# Patient Record
Sex: FEMALE | Race: WHITE | NOT HISPANIC OR LATINO | URBAN - METROPOLITAN AREA
[De-identification: names, ages, dates, MRNs, and addresses within clinical notes are randomized per-mention and may not be internally consistent; named-entity substitution may affect disease eponyms.]

---

## 2022-07-09 ENCOUNTER — TELEPHONE ENCOUNTER (OUTPATIENT)
Dept: URBAN - METROPOLITAN AREA CLINIC 121 | Facility: CLINIC | Age: 87
End: 2022-07-09

## 2022-07-10 ENCOUNTER — TELEPHONE ENCOUNTER (OUTPATIENT)
Dept: URBAN - METROPOLITAN AREA CLINIC 121 | Facility: CLINIC | Age: 87
End: 2022-07-10

## 2022-07-10 RX ORDER — OMEGA-3-ACID ETHYL ESTERS 1 G/1
CAPSULE, LIQUID FILLED ORAL
Refills: 0 | Status: ACTIVE | COMMUNITY
Start: 2019-11-01

## 2022-07-10 RX ORDER — ASPIRIN 81 MG/1
TABLET, COATED ORAL
Refills: 0 | Status: ACTIVE | COMMUNITY
Start: 2019-11-01

## 2022-07-10 RX ORDER — DEXTROMETHORPHAN POLISTIREX 30 MG/5 ML
SUSPENSION, EXTENDED RELEASE 12 HR ORAL ONCE A DAY
Refills: 0 | Status: ACTIVE | COMMUNITY
Start: 2019-11-01

## 2022-07-10 RX ORDER — IRBESARTAN 75 MG/1
TABLET ORAL
Refills: 0 | Status: ACTIVE | COMMUNITY
Start: 2019-11-01

## 2022-07-10 RX ORDER — NEBIVOLOL HYDROCHLORIDE 10 MG/1
TABLET ORAL
Refills: 0 | Status: ACTIVE | COMMUNITY
Start: 2019-11-01

## 2022-07-10 RX ORDER — AMLODIPINE BESYLATE 5 MG/1
TABLET ORAL ONCE A DAY
Refills: 0 | Status: ACTIVE | COMMUNITY
Start: 2019-11-01

## 2023-05-08 ENCOUNTER — APPOINTMENT (OUTPATIENT)
Dept: PRIMARY CARE | Facility: CLINIC | Age: 88
End: 2023-05-08
Payer: MEDICARE

## 2023-05-19 DIAGNOSIS — E53.8 VITAMIN B12 DEFICIENCY: ICD-10-CM

## 2023-05-19 PROBLEM — M79.673 FOOT PAIN: Status: ACTIVE | Noted: 2023-05-19

## 2023-05-19 PROBLEM — G89.29 CHRONIC BACK PAIN: Status: ACTIVE | Noted: 2023-05-19

## 2023-05-19 PROBLEM — K21.9 GERD (GASTROESOPHAGEAL REFLUX DISEASE): Status: ACTIVE | Noted: 2023-05-19

## 2023-05-19 PROBLEM — M19.90 INFLAMMATORY ARTHROPATHY: Status: ACTIVE | Noted: 2023-05-19

## 2023-05-19 PROBLEM — L98.9 SKIN LESION: Status: ACTIVE | Noted: 2023-05-19

## 2023-05-19 PROBLEM — D50.9 IRON DEFICIENCY ANEMIA: Status: ACTIVE | Noted: 2023-05-19

## 2023-05-19 PROBLEM — R53.83 FATIGUE: Status: ACTIVE | Noted: 2023-05-19

## 2023-05-19 PROBLEM — S49.90XA SHOULDER INJURY: Status: ACTIVE | Noted: 2023-05-19

## 2023-05-19 PROBLEM — D64.9 ANEMIA: Status: ACTIVE | Noted: 2023-05-19

## 2023-05-19 PROBLEM — N32.81 OVERACTIVE BLADDER: Status: ACTIVE | Noted: 2023-05-19

## 2023-05-19 PROBLEM — R76.8 POSITIVE ANA (ANTINUCLEAR ANTIBODY): Status: ACTIVE | Noted: 2023-05-19

## 2023-05-19 PROBLEM — M25.50 ARTHRALGIA: Status: ACTIVE | Noted: 2023-05-19

## 2023-05-19 PROBLEM — L30.9 ECZEMA: Status: ACTIVE | Noted: 2023-05-19

## 2023-05-19 PROBLEM — I10 HTN (HYPERTENSION): Status: ACTIVE | Noted: 2023-05-19

## 2023-05-19 PROBLEM — J30.9 ALLERGIC RHINITIS: Status: ACTIVE | Noted: 2023-05-19

## 2023-05-19 PROBLEM — N18.30 CKD (CHRONIC KIDNEY DISEASE), STAGE III (MULTI): Status: ACTIVE | Noted: 2023-05-19

## 2023-05-19 PROBLEM — L03.90 CELLULITIS: Status: ACTIVE | Noted: 2023-05-19

## 2023-05-19 PROBLEM — E55.9 VITAMIN D DEFICIENCY: Status: ACTIVE | Noted: 2023-05-19

## 2023-05-19 PROBLEM — I48.0 PAROXYSMAL ATRIAL FIBRILLATION WITH RVR (MULTI): Status: ACTIVE | Noted: 2023-05-19

## 2023-05-19 PROBLEM — M54.9 CHRONIC BACK PAIN: Status: ACTIVE | Noted: 2023-05-19

## 2023-05-19 RX ORDER — CYANOCOBALAMIN 1000 UG/ML
1000 INJECTION, SOLUTION INTRAMUSCULAR; SUBCUTANEOUS
Qty: 10 ML | Refills: 0 | Status: SHIPPED | OUTPATIENT
Start: 2023-05-19

## 2023-05-19 NOTE — TELEPHONE ENCOUNTER
Sp with dtr.     Has not had vitamin B12 since February.     Needs it called into her [facility].     She will have her friend/neighbor administer

## 2023-05-19 NOTE — TELEPHONE ENCOUNTER
Patient lm- she has skipped 3 months of b12. [place].     C/o weakness, fatigue  heart palpitations    Dtr told her these are sx of low b12    Requesting rx vitamin b12 injections

## 2023-06-29 DIAGNOSIS — I10 PRIMARY HYPERTENSION: Primary | ICD-10-CM

## 2023-06-29 RX ORDER — LOSARTAN POTASSIUM 50 MG/1
TABLET ORAL
Qty: 90 TABLET | Refills: 3 | Status: SHIPPED | OUTPATIENT
Start: 2023-06-29 | End: 2023-09-07 | Stop reason: SDUPTHER

## 2023-08-29 ENCOUNTER — OFFICE VISIT (OUTPATIENT)
Dept: PRIMARY CARE | Facility: CLINIC | Age: 88
End: 2023-08-29
Payer: MEDICARE

## 2023-08-29 VITALS
OXYGEN SATURATION: 96 % | SYSTOLIC BLOOD PRESSURE: 130 MMHG | BODY MASS INDEX: 25.16 KG/M2 | RESPIRATION RATE: 14 BRPM | DIASTOLIC BLOOD PRESSURE: 68 MMHG | WEIGHT: 142 LBS | TEMPERATURE: 97.2 F | HEIGHT: 63 IN | HEART RATE: 78 BPM

## 2023-08-29 DIAGNOSIS — N63.32 MASS OF AXILLARY TAIL OF LEFT BREAST: ICD-10-CM

## 2023-08-29 DIAGNOSIS — L30.9 ECZEMA, UNSPECIFIED TYPE: ICD-10-CM

## 2023-08-29 DIAGNOSIS — N18.31 STAGE 3A CHRONIC KIDNEY DISEASE (MULTI): ICD-10-CM

## 2023-08-29 DIAGNOSIS — Z00.00 WELLNESS EXAMINATION: ICD-10-CM

## 2023-08-29 DIAGNOSIS — I48.0 PAROXYSMAL ATRIAL FIBRILLATION WITH RVR (MULTI): ICD-10-CM

## 2023-08-29 DIAGNOSIS — M25.50 ARTHRALGIA, UNSPECIFIED JOINT: ICD-10-CM

## 2023-08-29 DIAGNOSIS — M54.9 CHRONIC BACK PAIN, UNSPECIFIED BACK LOCATION, UNSPECIFIED BACK PAIN LATERALITY: ICD-10-CM

## 2023-08-29 DIAGNOSIS — R26.89 BALANCE PROBLEM: ICD-10-CM

## 2023-08-29 DIAGNOSIS — J30.2 SEASONAL ALLERGIES: ICD-10-CM

## 2023-08-29 DIAGNOSIS — G89.29 CHRONIC BACK PAIN, UNSPECIFIED BACK LOCATION, UNSPECIFIED BACK PAIN LATERALITY: ICD-10-CM

## 2023-08-29 DIAGNOSIS — E53.8 VITAMIN B12 DEFICIENCY: ICD-10-CM

## 2023-08-29 DIAGNOSIS — J81.0 ACUTE PULMONARY EDEMA (MULTI): Primary | ICD-10-CM

## 2023-08-29 DIAGNOSIS — D64.9 ANEMIA, UNSPECIFIED TYPE: ICD-10-CM

## 2023-08-29 DIAGNOSIS — N63.20 MASS OF LEFT BREAST, UNSPECIFIED QUADRANT: ICD-10-CM

## 2023-08-29 PROBLEM — S49.90XA SHOULDER INJURY: Status: RESOLVED | Noted: 2023-05-19 | Resolved: 2023-08-29

## 2023-08-29 PROBLEM — L98.9 SKIN LESION: Status: RESOLVED | Noted: 2023-05-19 | Resolved: 2023-08-29

## 2023-08-29 PROBLEM — M79.673 FOOT PAIN: Status: RESOLVED | Noted: 2023-05-19 | Resolved: 2023-08-29

## 2023-08-29 LAB — POC HEMOGLOBIN: 11.9 G/DL (ref 12–16)

## 2023-08-29 PROCEDURE — 99214 OFFICE O/P EST MOD 30 MIN: CPT | Performed by: INTERNAL MEDICINE

## 2023-08-29 PROCEDURE — 3075F SYST BP GE 130 - 139MM HG: CPT | Performed by: INTERNAL MEDICINE

## 2023-08-29 PROCEDURE — 85018 HEMOGLOBIN: CPT | Performed by: INTERNAL MEDICINE

## 2023-08-29 PROCEDURE — 90662 IIV NO PRSV INCREASED AG IM: CPT | Performed by: INTERNAL MEDICINE

## 2023-08-29 PROCEDURE — 3078F DIAST BP <80 MM HG: CPT | Performed by: INTERNAL MEDICINE

## 2023-08-29 PROCEDURE — G0439 PPPS, SUBSEQ VISIT: HCPCS | Performed by: INTERNAL MEDICINE

## 2023-08-29 PROCEDURE — G0008 ADMIN INFLUENZA VIRUS VAC: HCPCS | Performed by: INTERNAL MEDICINE

## 2023-08-29 PROCEDURE — 1159F MED LIST DOCD IN RCRD: CPT | Performed by: INTERNAL MEDICINE

## 2023-08-29 PROCEDURE — 1170F FXNL STATUS ASSESSED: CPT | Performed by: INTERNAL MEDICINE

## 2023-08-29 PROCEDURE — 1160F RVW MEDS BY RX/DR IN RCRD: CPT | Performed by: INTERNAL MEDICINE

## 2023-08-29 PROCEDURE — 1036F TOBACCO NON-USER: CPT | Performed by: INTERNAL MEDICINE

## 2023-08-29 PROCEDURE — 1157F ADVNC CARE PLAN IN RCRD: CPT | Performed by: INTERNAL MEDICINE

## 2023-08-29 PROCEDURE — 96372 THER/PROPH/DIAG INJ SC/IM: CPT | Performed by: INTERNAL MEDICINE

## 2023-08-29 RX ORDER — CLOBETASOL PROPIONATE 0.46 MG/ML
SOLUTION TOPICAL 2 TIMES DAILY
COMMUNITY
End: 2023-08-29 | Stop reason: SDUPTHER

## 2023-08-29 RX ORDER — OMEGA-3/DHA/EPA/FISH OIL 300-1000MG
CAPSULE,DELAYED RELEASE (ENTERIC COATED) ORAL 2 TIMES DAILY
COMMUNITY

## 2023-08-29 RX ORDER — ALBUTEROL SULFATE 90 UG/1
2 AEROSOL, METERED RESPIRATORY (INHALATION) EVERY 4 HOURS PRN
COMMUNITY
Start: 2014-12-29

## 2023-08-29 RX ORDER — LANOLIN ALCOHOL/MO/W.PET/CERES
1 CREAM (GRAM) TOPICAL DAILY
COMMUNITY
Start: 2013-09-17

## 2023-08-29 RX ORDER — CYANOCOBALAMIN 1000 UG/ML
1000 INJECTION, SOLUTION INTRAMUSCULAR; SUBCUTANEOUS ONCE
Status: COMPLETED | OUTPATIENT
Start: 2023-08-29 | End: 2023-08-29

## 2023-08-29 RX ORDER — FLUTICASONE PROPIONATE 50 MCG
2 SPRAY, SUSPENSION (ML) NASAL DAILY
Qty: 16 G | Refills: 3 | Status: SHIPPED | OUTPATIENT
Start: 2023-08-29

## 2023-08-29 RX ORDER — PANTOPRAZOLE SODIUM 40 MG/1
40 TABLET, DELAYED RELEASE ORAL
Qty: 90 TABLET | Refills: 3 | Status: SHIPPED | OUTPATIENT
Start: 2023-08-29 | End: 2023-09-05

## 2023-08-29 RX ORDER — FERROUS SULFATE 325(65) MG
325 TABLET ORAL
COMMUNITY
Start: 2023-06-13 | End: 2024-02-12 | Stop reason: WASHOUT

## 2023-08-29 RX ORDER — HYDROXYZINE HYDROCHLORIDE 25 MG/1
25 TABLET, FILM COATED ORAL EVERY 8 HOURS PRN
COMMUNITY

## 2023-08-29 RX ORDER — AMOXICILLIN 250 MG
2 CAPSULE ORAL DAILY PRN
COMMUNITY
Start: 2018-12-06

## 2023-08-29 RX ORDER — FLUTICASONE PROPIONATE 50 MCG
2 SPRAY, SUSPENSION (ML) NASAL DAILY
COMMUNITY
Start: 2015-10-21 | End: 2023-08-29 | Stop reason: SDUPTHER

## 2023-08-29 RX ORDER — DILTIAZEM HYDROCHLORIDE 120 MG/1
120 CAPSULE, COATED, EXTENDED RELEASE ORAL EVERY 12 HOURS
COMMUNITY
Start: 2023-08-08

## 2023-08-29 RX ORDER — PANTOPRAZOLE SODIUM 40 MG/1
40 TABLET, DELAYED RELEASE ORAL 2 TIMES DAILY
COMMUNITY
Start: 2023-06-13 | End: 2023-08-29 | Stop reason: DRUGHIGH

## 2023-08-29 RX ORDER — ACETAMINOPHEN 500 MG
500 TABLET ORAL
COMMUNITY

## 2023-08-29 RX ORDER — HYDRALAZINE HYDROCHLORIDE 25 MG/1
25 TABLET, FILM COATED ORAL NIGHTLY
COMMUNITY

## 2023-08-29 RX ORDER — CLOBETASOL PROPIONATE 0.46 MG/ML
SOLUTION TOPICAL 2 TIMES DAILY
Qty: 150 ML | Refills: 3 | Status: SHIPPED | OUTPATIENT
Start: 2023-08-29

## 2023-08-29 RX ORDER — FERROUS SULFATE, DRIED 160(50) MG
1 TABLET, EXTENDED RELEASE ORAL
COMMUNITY

## 2023-08-29 RX ADMIN — CYANOCOBALAMIN 1000 MCG: 1000 INJECTION, SOLUTION INTRAMUSCULAR; SUBCUTANEOUS at 16:12

## 2023-08-29 ASSESSMENT — ACTIVITIES OF DAILY LIVING (ADL)
DOING_HOUSEWORK: INDEPENDENT
TAKING_MEDICATION: INDEPENDENT
DRESSING: INDEPENDENT
GROCERY_SHOPPING: INDEPENDENT
BATHING: INDEPENDENT
MANAGING_FINANCES: INDEPENDENT

## 2023-08-29 ASSESSMENT — PATIENT HEALTH QUESTIONNAIRE - PHQ9
1. LITTLE INTEREST OR PLEASURE IN DOING THINGS: NOT AT ALL
2. FEELING DOWN, DEPRESSED OR HOPELESS: NOT AT ALL
SUM OF ALL RESPONSES TO PHQ9 QUESTIONS 1 AND 2: 0

## 2023-08-29 ASSESSMENT — ENCOUNTER SYMPTOMS
DEPRESSION: 0
OCCASIONAL FEELINGS OF UNSTEADINESS: 0
LOSS OF SENSATION IN FEET: 1

## 2023-08-29 NOTE — PROGRESS NOTES
"Subjective    Liana Man is a 87 y.o. female who presents for Medicare Annual Wellness Visit Subsequent.  HPI    Living will requested   Has been more dependent on her walker. Her kids have been driving her around.  was in the hospital/rehab for a couple months(May)  She has no energy. She has not sob. No palitations no chest pain    Would like PT order to regain strength.     Due for B12 injection    Appt with Dr. Garnett 9/27/23    Had blood transfusion while in [place]. Hgb 4. She was feeling fatigued. She had  no rectal bleeding  or signs of blood  loss. She had egd and that was   Negative,  Feels fatigued, weak.     Review of Systems   All other systems reviewed and are negative.        Objective     /68 (BP Location: Left arm, Patient Position: Sitting, BP Cuff Size: Adult)   Pulse 78   Temp 36.2 °C (97.2 °F) (Skin)   Resp 14   Ht 1.6 m (5' 3\")   Wt 64.4 kg (142 lb)   SpO2 96%   BMI 25.15 kg/m²    Physical Exam  Health Maintenance Due   Topic Date Due    Medicare Annual Wellness Visit (AWV)  Never done    DTaP/Tdap/Td Vaccines (1 - Tdap) 09/15/2015    COVID-19 Vaccine (3 - Pfizer series) 09/30/2021    Echocardiogram  01/23/2023    Bone Density Scan  08/31/2023          Assessment/Plan   Problem List Items Addressed This Visit       Anemia    Relevant Medications    fluticasone (Flonase) 50 mcg/actuation nasal spray    Other Relevant Orders    POCT Hemoglobin manually resulted (Completed)    Iron and TIBC    CBC    CKD (chronic kidney disease), stage III (CMS/HCC)    Eczema    Relevant Medications    clobetasol (Temovate) 0.05 % external solution    Paroxysmal atrial fibrillation with RVR (CMS/HCC)    Relevant Medications    dilTIAZem CD (Cardizem CD) 120 mg 24 hr capsule    Acute pulmonary edema (CMS/HCC) - Primary     Other Visit Diagnoses       Seasonal allergies        Wellness examination        Relevant Orders    Lipid Panel    Comprehensive Metabolic Panel    Mass of left " breast, unspecified quadrant        Relevant Orders    BI mammo bilateral diagnostic    Mass of axillary tail of left breast        Relevant Orders    BI mammo bilateral diagnostic        Check labs.   Flu shot given   B12 given  Her labs were reviewed.   Call  with any problems or questions.   Follow up in 6 months.

## 2023-08-30 ENCOUNTER — TELEPHONE (OUTPATIENT)
Dept: PRIMARY CARE | Facility: CLINIC | Age: 88
End: 2023-08-30
Payer: MEDICARE

## 2023-08-30 NOTE — TELEPHONE ENCOUNTER
Gabrielle Turner CMA  Sent          Previous Messages       ----- Message -----  From: Malcolm Turner CMA  Sent: 8/30/2023  11:38 AM EDT  To: Gabrielle Rasmussen    Can you send her PT referral to [facility]? I don't think Aubree can schedule there

## 2023-09-01 ENCOUNTER — LAB (OUTPATIENT)
Dept: LAB | Facility: LAB | Age: 88
End: 2023-09-01
Payer: MEDICARE

## 2023-09-01 DIAGNOSIS — Z00.00 WELLNESS EXAMINATION: ICD-10-CM

## 2023-09-01 DIAGNOSIS — D64.9 ANEMIA, UNSPECIFIED TYPE: ICD-10-CM

## 2023-09-01 LAB
ALANINE AMINOTRANSFERASE (SGPT) (U/L) IN SER/PLAS: 9 U/L (ref 7–45)
ALBUMIN (G/DL) IN SER/PLAS: 4.2 G/DL (ref 3.4–5)
ALKALINE PHOSPHATASE (U/L) IN SER/PLAS: 57 U/L (ref 33–136)
ANION GAP IN SER/PLAS: 13 MMOL/L (ref 10–20)
ASPARTATE AMINOTRANSFERASE (SGOT) (U/L) IN SER/PLAS: 12 U/L (ref 9–39)
BILIRUBIN TOTAL (MG/DL) IN SER/PLAS: 0.5 MG/DL (ref 0–1.2)
CALCIUM (MG/DL) IN SER/PLAS: 9.2 MG/DL (ref 8.6–10.3)
CARBON DIOXIDE, TOTAL (MMOL/L) IN SER/PLAS: 31 MMOL/L (ref 21–32)
CHLORIDE (MMOL/L) IN SER/PLAS: 100 MMOL/L (ref 98–107)
CHOLESTEROL (MG/DL) IN SER/PLAS: 175 MG/DL (ref 0–199)
CHOLESTEROL IN HDL (MG/DL) IN SER/PLAS: 42.2 MG/DL
CHOLESTEROL/HDL RATIO: 4.1
CREATININE (MG/DL) IN SER/PLAS: 1.13 MG/DL (ref 0.5–1.05)
ERYTHROCYTE DISTRIBUTION WIDTH (RATIO) BY AUTOMATED COUNT: 17 % (ref 11.5–14.5)
ERYTHROCYTE MEAN CORPUSCULAR HEMOGLOBIN CONCENTRATION (G/DL) BY AUTOMATED: 31.8 G/DL (ref 32–36)
ERYTHROCYTE MEAN CORPUSCULAR VOLUME (FL) BY AUTOMATED COUNT: 88 FL (ref 80–100)
ERYTHROCYTES (10*6/UL) IN BLOOD BY AUTOMATED COUNT: 4.48 X10E12/L (ref 4–5.2)
GFR FEMALE: 47 ML/MIN/1.73M2
GLUCOSE (MG/DL) IN SER/PLAS: 108 MG/DL (ref 74–99)
HEMATOCRIT (%) IN BLOOD BY AUTOMATED COUNT: 39.3 % (ref 36–46)
HEMOGLOBIN (G/DL) IN BLOOD: 12.5 G/DL (ref 12–16)
IRON (UG/DL) IN SER/PLAS: 45 UG/DL (ref 35–150)
IRON BINDING CAPACITY (UG/DL) IN SER/PLAS: 301 UG/DL (ref 240–445)
IRON SATURATION (%) IN SER/PLAS: 15 % (ref 25–45)
LDL: 100 MG/DL (ref 0–99)
LEUKOCYTES (10*3/UL) IN BLOOD BY AUTOMATED COUNT: 7.4 X10E9/L (ref 4.4–11.3)
NRBC (PER 100 WBCS) BY AUTOMATED COUNT: 0 /100 WBC (ref 0–0)
PLATELETS (10*3/UL) IN BLOOD AUTOMATED COUNT: 286 X10E9/L (ref 150–450)
POTASSIUM (MMOL/L) IN SER/PLAS: 3.9 MMOL/L (ref 3.5–5.3)
PROTEIN TOTAL: 6.3 G/DL (ref 6.4–8.2)
SODIUM (MMOL/L) IN SER/PLAS: 140 MMOL/L (ref 136–145)
TRIGLYCERIDE (MG/DL) IN SER/PLAS: 164 MG/DL (ref 0–149)
UREA NITROGEN (MG/DL) IN SER/PLAS: 17 MG/DL (ref 6–23)
VLDL: 33 MG/DL (ref 0–40)

## 2023-09-01 PROCEDURE — 36415 COLL VENOUS BLD VENIPUNCTURE: CPT

## 2023-09-01 PROCEDURE — 85027 COMPLETE CBC AUTOMATED: CPT

## 2023-09-01 PROCEDURE — 80053 COMPREHEN METABOLIC PANEL: CPT

## 2023-09-01 PROCEDURE — 80061 LIPID PANEL: CPT

## 2023-09-01 PROCEDURE — 83540 ASSAY OF IRON: CPT

## 2023-09-01 PROCEDURE — 83550 IRON BINDING TEST: CPT

## 2023-09-03 DIAGNOSIS — K21.9 GASTROESOPHAGEAL REFLUX DISEASE, UNSPECIFIED WHETHER ESOPHAGITIS PRESENT: ICD-10-CM

## 2023-09-05 RX ORDER — PANTOPRAZOLE SODIUM 40 MG/1
40 TABLET, DELAYED RELEASE ORAL DAILY
Qty: 90 TABLET | Refills: 3 | Status: SHIPPED | OUTPATIENT
Start: 2023-09-05 | End: 2024-02-12 | Stop reason: WASHOUT

## 2023-09-05 NOTE — RESULT ENCOUNTER NOTE
Her labs are good. Her kidney function is stable.  Her blood count is normal and her iron levels are good. Her iron stores are a little low so I would continue once a day iron supplement

## 2023-09-06 ENCOUNTER — TELEPHONE (OUTPATIENT)
Dept: PRIMARY CARE | Facility: CLINIC | Age: 88
End: 2023-09-06
Payer: MEDICARE

## 2023-09-06 NOTE — TELEPHONE ENCOUNTER
Brooklyn Henriquez, DO Malcolm Turner, CMA  Her labs are good. Her kidney function is stable.  Her blood count is normal and her iron levels are good. Her iron stores are a little low so I would continue once a day iron supplement

## 2023-09-07 ENCOUNTER — TELEPHONE (OUTPATIENT)
Dept: PRIMARY CARE | Facility: CLINIC | Age: 88
End: 2023-09-07
Payer: MEDICARE

## 2023-09-07 DIAGNOSIS — I10 PRIMARY HYPERTENSION: ICD-10-CM

## 2023-09-07 RX ORDER — LOSARTAN POTASSIUM 50 MG/1
50 TABLET ORAL DAILY
Qty: 90 TABLET | Refills: 3 | Status: SHIPPED | OUTPATIENT
Start: 2023-09-07 | End: 2023-09-07 | Stop reason: SDUPTHER

## 2023-09-07 RX ORDER — LOSARTAN POTASSIUM 50 MG/1
50 TABLET ORAL DAILY
Qty: 90 TABLET | Refills: 3 | Status: SHIPPED | OUTPATIENT
Start: 2023-09-07

## 2023-10-02 ENCOUNTER — TELEPHONE (OUTPATIENT)
Dept: PRIMARY CARE | Facility: CLINIC | Age: 88
End: 2023-10-02
Payer: MEDICARE

## 2023-10-02 DIAGNOSIS — Z12.39 ENCOUNTER FOR BREAST CANCER SCREENING OTHER THAN MAMMOGRAM: ICD-10-CM

## 2023-10-02 DIAGNOSIS — Z12.31 ENCOUNTER FOR SCREENING MAMMOGRAM FOR MALIGNANT NEOPLASM OF BREAST: ICD-10-CM

## 2023-10-02 NOTE — TELEPHONE ENCOUNTER
[facility]iology Scheduling, left  stating pt needs an order dx mamm.  Scheduled in August.  Was previously cancelled.     Call back w/questions [phone]

## 2023-10-16 ENCOUNTER — APPOINTMENT (OUTPATIENT)
Dept: RADIOLOGY | Facility: HOSPITAL | Age: 88
End: 2023-10-16
Payer: MEDICARE

## 2023-10-18 ENCOUNTER — APPOINTMENT (OUTPATIENT)
Dept: RADIOLOGY | Facility: HOSPITAL | Age: 88
End: 2023-10-18
Payer: MEDICARE

## 2023-10-20 ENCOUNTER — LAB (OUTPATIENT)
Dept: LAB | Facility: LAB | Age: 88
End: 2023-10-20
Payer: MEDICARE

## 2023-10-20 ENCOUNTER — TELEPHONE (OUTPATIENT)
Dept: PRIMARY CARE | Facility: CLINIC | Age: 88
End: 2023-10-20
Payer: MEDICARE

## 2023-10-20 DIAGNOSIS — E55.9 VITAMIN D DEFICIENCY: ICD-10-CM

## 2023-10-20 DIAGNOSIS — I10 PRIMARY HYPERTENSION: ICD-10-CM

## 2023-10-20 DIAGNOSIS — R53.83 OTHER FATIGUE: Primary | ICD-10-CM

## 2023-10-20 DIAGNOSIS — N18.32 STAGE 3B CHRONIC KIDNEY DISEASE (MULTI): ICD-10-CM

## 2023-10-20 DIAGNOSIS — R53.83 OTHER FATIGUE: ICD-10-CM

## 2023-10-20 DIAGNOSIS — D50.9 IRON DEFICIENCY ANEMIA, UNSPECIFIED IRON DEFICIENCY ANEMIA TYPE: ICD-10-CM

## 2023-10-20 LAB
25(OH)D3 SERPL-MCNC: 77 NG/ML (ref 30–100)
ANION GAP SERPL CALC-SCNC: 14 MMOL/L (ref 10–20)
BUN SERPL-MCNC: 14 MG/DL (ref 6–23)
CALCIUM SERPL-MCNC: 9 MG/DL (ref 8.6–10.3)
CHLORIDE SERPL-SCNC: 102 MMOL/L (ref 98–107)
CO2 SERPL-SCNC: 27 MMOL/L (ref 21–32)
CREAT SERPL-MCNC: 0.96 MG/DL (ref 0.5–1.05)
ERYTHROCYTE [DISTWIDTH] IN BLOOD BY AUTOMATED COUNT: 13.8 % (ref 11.5–14.5)
GFR SERPL CREATININE-BSD FRML MDRD: 58 ML/MIN/1.73M*2
GLUCOSE SERPL-MCNC: 129 MG/DL (ref 74–99)
HCT VFR BLD AUTO: 38.5 % (ref 36–46)
HGB BLD-MCNC: 12.6 G/DL (ref 12–16)
MCH RBC QN AUTO: 29.4 PG (ref 26–34)
MCHC RBC AUTO-ENTMCNC: 32.7 G/DL (ref 32–36)
MCV RBC AUTO: 90 FL (ref 80–100)
NRBC BLD-RTO: 0 /100 WBCS (ref 0–0)
PLATELET # BLD AUTO: 252 X10*3/UL (ref 150–450)
PMV BLD AUTO: 9.5 FL (ref 7.5–11.5)
POTASSIUM SERPL-SCNC: 3.6 MMOL/L (ref 3.5–5.3)
RBC # BLD AUTO: 4.29 X10*6/UL (ref 4–5.2)
SODIUM SERPL-SCNC: 139 MMOL/L (ref 136–145)
VIT B12 SERPL-MCNC: 790 PG/ML (ref 211–911)
WBC # BLD AUTO: 7 X10*3/UL (ref 4.4–11.3)

## 2023-10-20 PROCEDURE — 82607 VITAMIN B-12: CPT

## 2023-10-20 PROCEDURE — 85027 COMPLETE CBC AUTOMATED: CPT

## 2023-10-20 PROCEDURE — 80048 BASIC METABOLIC PNL TOTAL CA: CPT

## 2023-10-20 PROCEDURE — 36415 COLL VENOUS BLD VENIPUNCTURE: CPT

## 2023-10-20 PROCEDURE — 82306 VITAMIN D 25 HYDROXY: CPT

## 2023-10-20 NOTE — TELEPHONE ENCOUNTER
DO Malcolm Maloney, [facility]  Caller: Unspecified (Today,  9:40 AM)  I ordered labs. If her sx worsen over weekend she can go to urgent care at [facility] or in [place]

## 2023-10-23 ENCOUNTER — TELEPHONE (OUTPATIENT)
Dept: PRIMARY CARE | Facility: CLINIC | Age: 88
End: 2023-10-23
Payer: MEDICARE

## 2023-10-23 NOTE — TELEPHONE ENCOUNTER
----- Message from Brooklyn Henriquez DO sent at 10/23/2023  9:53 AM EDT -----  Her labs are good. No anemia

## 2023-10-26 ENCOUNTER — APPOINTMENT (OUTPATIENT)
Dept: CARDIOLOGY | Facility: CLINIC | Age: 88
End: 2023-10-26
Payer: MEDICARE

## 2023-10-30 ENCOUNTER — HOSPITAL ENCOUNTER (OUTPATIENT)
Dept: RADIOLOGY | Facility: HOSPITAL | Age: 88
Discharge: HOME | End: 2023-10-30
Payer: MEDICARE

## 2023-10-30 VITALS — HEIGHT: 62 IN | BODY MASS INDEX: 24.84 KG/M2 | WEIGHT: 135 LBS

## 2023-10-30 DIAGNOSIS — Z12.31 ENCOUNTER FOR SCREENING MAMMOGRAM FOR MALIGNANT NEOPLASM OF BREAST: ICD-10-CM

## 2023-10-30 DIAGNOSIS — N63.21 BREAST LUMP ON LEFT SIDE AT 2 O'CLOCK POSITION: ICD-10-CM

## 2023-10-30 DIAGNOSIS — Z12.39 ENCOUNTER FOR BREAST CANCER SCREENING OTHER THAN MAMMOGRAM: ICD-10-CM

## 2023-10-30 PROCEDURE — 77066 DX MAMMO INCL CAD BI: CPT | Performed by: RADIOLOGY

## 2023-10-30 PROCEDURE — G0279 TOMOSYNTHESIS, MAMMO: HCPCS | Performed by: RADIOLOGY

## 2023-10-30 PROCEDURE — 77062 BREAST TOMOSYNTHESIS BI: CPT

## 2023-11-01 ENCOUNTER — TELEPHONE (OUTPATIENT)
Dept: PRIMARY CARE | Facility: CLINIC | Age: 88
End: 2023-11-01
Payer: MEDICARE

## 2023-11-01 NOTE — TELEPHONE ENCOUNTER
----- Message from Brooklyn Henriquez DO sent at 10/31/2023  9:42 AM EDT -----  Breast lump is due to fat necrosis. No evidence of malignancy in either breast

## 2023-11-06 ENCOUNTER — HOSPITAL ENCOUNTER (OUTPATIENT)
Dept: RADIOLOGY | Facility: HOSPITAL | Age: 88
Discharge: HOME | End: 2023-11-06
Payer: MEDICARE

## 2023-11-06 DIAGNOSIS — N63.42 LUMP IN CENTRAL PORTION OF LEFT BREAST: ICD-10-CM

## 2023-11-06 DIAGNOSIS — N63.0 BREAST LUMP IN FEMALE: ICD-10-CM

## 2023-11-06 PROCEDURE — 76642 ULTRASOUND BREAST LIMITED: CPT | Mod: LEFT SIDE | Performed by: RADIOLOGY

## 2023-11-06 PROCEDURE — 76642 ULTRASOUND BREAST LIMITED: CPT | Mod: LT

## 2023-11-09 ENCOUNTER — TELEPHONE (OUTPATIENT)
Dept: PRIMARY CARE | Facility: CLINIC | Age: 88
End: 2023-11-09
Payer: MEDICARE

## 2023-11-09 NOTE — TELEPHONE ENCOUNTER
----- Message from Brooklyn Henriquez DO sent at 11/7/2023 10:38 AM EST -----  Us shows area of benign fat necrosis.

## 2023-11-16 ENCOUNTER — ANCILLARY PROCEDURE (OUTPATIENT)
Dept: RADIOLOGY | Facility: CLINIC | Age: 88
End: 2023-11-16
Payer: MEDICARE

## 2023-11-16 ENCOUNTER — OFFICE VISIT (OUTPATIENT)
Dept: ORTHOPEDIC SURGERY | Facility: CLINIC | Age: 88
End: 2023-11-16
Payer: MEDICARE

## 2023-11-16 VITALS — HEIGHT: 62 IN | WEIGHT: 141 LBS | BODY MASS INDEX: 25.95 KG/M2

## 2023-11-16 DIAGNOSIS — M51.36 LUMBAR DEGENERATIVE DISC DISEASE: ICD-10-CM

## 2023-11-16 DIAGNOSIS — M54.50 LOW BACK PAIN, UNSPECIFIED BACK PAIN LATERALITY, UNSPECIFIED CHRONICITY, UNSPECIFIED WHETHER SCIATICA PRESENT: ICD-10-CM

## 2023-11-16 PROCEDURE — 1159F MED LIST DOCD IN RCRD: CPT | Performed by: ORTHOPAEDIC SURGERY

## 2023-11-16 PROCEDURE — 99205 OFFICE O/P NEW HI 60 MIN: CPT | Performed by: ORTHOPAEDIC SURGERY

## 2023-11-16 PROCEDURE — 1036F TOBACCO NON-USER: CPT | Performed by: ORTHOPAEDIC SURGERY

## 2023-11-16 PROCEDURE — 99215 OFFICE O/P EST HI 40 MIN: CPT | Mod: PN,25 | Performed by: ORTHOPAEDIC SURGERY

## 2023-11-16 PROCEDURE — 72120 X-RAY BEND ONLY L-S SPINE: CPT

## 2023-11-16 PROCEDURE — 72110 X-RAY EXAM L-2 SPINE 4/>VWS: CPT | Performed by: RADIOLOGY

## 2023-11-16 PROCEDURE — 1160F RVW MEDS BY RX/DR IN RCRD: CPT | Performed by: ORTHOPAEDIC SURGERY

## 2023-11-16 NOTE — PROGRESS NOTES
Chief complaint: Back pain right leg weakness    HPI: 88-year-old female with a long history of back pain and right leg pain.  She had kyphoplasties done at T12 and L1 and a interspinous spacer done down at the L5-S1 level.  This was all done down in [place] in [place] where she lives sometimes in the winter.  She was recommended by a friend to come see me.  Her main issue is back pain but the leg bothers her as well.  She cannot walk any sort of distance because of the back pain.  She cannot stand for any length of period time because of the back pain.  She is severely inhibited bodily function.  There is no fevers chills nausea vomiting.  There is no bowel or bladder changes.  She does has weakness in the right leg as well which keeps her from walking any sort of distance.  She uses a walker to walk.  She had physical therapy in the past.  She is had pain management in the past but nothing recently.  She has not done chiropractic.  She has history of the interspinous spacer but no other spine surgeries.    Physical exam: Well-nourished, well kept.  Good perfusion to the extremities ×4.  Radial and dorsalis pedis pulses 2+.  Capillary refill to all 4 digits brisk.  No distal edema x 4.  No lymphangitis or lymphadenopathy in the examined extremities.  Patient walks with an unsteady gait only with a walker due to her back pain and a little bit of right leg weakness that she perceives.  Examination of the neck reveals no swelling, step-off, or point tenderness.  Range of motion with flexion, extension, side bending and rotation is well maintained without crepitance, instability, or exacerbation of pain.  Strength is within normal limits.  Thoracic and lumbar spines show some tenderness and stiffness and decreased range of motion with pain at extremes.  There is a well-healed midline incision of her lumbar spine.  Examination of the lower extremities reveals no point tenderness, swelling, or deformity.  Range of  motion of the hips, knees, and ankles are full without crepitance, instability, or exacerbation of pain.  Strength is 5/5 throughout.  No redness, abrasions, or lesions on all 4 extremities, head and neck, or trunk.  Gross sensation intact in the extremities ×4.  Deep tendon reflexes 2+ and symmetric bilaterally.  Morgan, clonus, and Babinski were negative.  Straight leg raise negative.  Affect normal.  Alert and oriented ×3.  Coordination normal.    MRI was reviewed and x-rays were reviewed.  The patient has an interspinous spacer as well as kyphoplasties at L1-2 and L2-3.  There is no real significant stenosis anywhere in the lumbar spine but at L4-5 there is moderate central stenosis and moderate to severe right-sided foraminal stenosis there are no fractures anywhere in the lumbar spine.  There is no fractures in the lumbar spine.  Assessment patient with mainly back pain and some right leg weakness.  It is possible that the right leg weakness is coming from L4-5 on the right.  This is a patient who I think should be treated nonsurgically.  I explained to her that there is no surgery that we will help her with her back pain.  And I do not think any surgery would be beneficial to her for her right leg weakness.  It is possible a laminectomy at L4-5 might help her but I think the risks outweigh the benefits.  Given her medical comorbidities age and fragility were going to de-escalate care on her and have her treated nonsurgically.  We will get a get her into chiropractic, physical therapy, and pain management.  She can follow-up with me on a as needed basis.

## 2023-11-30 ENCOUNTER — TELEPHONE (OUTPATIENT)
Dept: ORTHOPEDIC SURGERY | Facility: CLINIC | Age: 88
End: 2023-11-30
Payer: MEDICARE

## 2023-11-30 NOTE — TELEPHONE ENCOUNTER
I returned pt's vm, pt stated Dr Phillips referred to 3 places, chrio which she has heard from.  However p t and pain mgmt has not.  I stated refaxed to p t [facility] office [facility] gave her their #.  Stated I also prev faxed (right faxed) to [facility] for pain mgmt.  Pt given [facility] # also with instructions if problems give me a call back.

## 2023-12-15 ENCOUNTER — TELEPHONE (OUTPATIENT)
Dept: PRIMARY CARE | Facility: CLINIC | Age: 88
End: 2023-12-15
Payer: MEDICARE

## 2023-12-18 NOTE — TELEPHONE ENCOUNTER
Seeing chiropractor on the recommendation of her back surgeon.   Told she has a lot of inflammation in back and recommended anti inflammatory.  Would you be willing to prescribe?

## 2023-12-19 NOTE — TELEPHONE ENCOUNTER
DO Malcolm Maloney, [facility]  Caller: Unspecified (4 days ago,  3:38 PM)  She has ckd. I would be very careful with any nsaids.

## 2024-01-16 ENCOUNTER — OFFICE VISIT (OUTPATIENT)
Dept: PAIN MEDICINE | Facility: CLINIC | Age: 89
End: 2024-01-16
Payer: MEDICARE

## 2024-01-16 DIAGNOSIS — M51.36 LUMBAR DEGENERATIVE DISC DISEASE: ICD-10-CM

## 2024-01-16 DIAGNOSIS — M54.50 LOW BACK PAIN, UNSPECIFIED BACK PAIN LATERALITY, UNSPECIFIED CHRONICITY, UNSPECIFIED WHETHER SCIATICA PRESENT: ICD-10-CM

## 2024-01-16 PROCEDURE — 1036F TOBACCO NON-USER: CPT | Performed by: PAIN MEDICINE

## 2024-01-16 PROCEDURE — 99203 OFFICE O/P NEW LOW 30 MIN: CPT | Performed by: PAIN MEDICINE

## 2024-01-16 PROCEDURE — 1125F AMNT PAIN NOTED PAIN PRSNT: CPT | Performed by: PAIN MEDICINE

## 2024-01-16 PROCEDURE — 1160F RVW MEDS BY RX/DR IN RCRD: CPT | Performed by: PAIN MEDICINE

## 2024-01-16 ASSESSMENT — PAIN - FUNCTIONAL ASSESSMENT: PAIN_FUNCTIONAL_ASSESSMENT: 0-10

## 2024-01-16 ASSESSMENT — PAIN SCALES - GENERAL: PAINLEVEL_OUTOF10: 6

## 2024-01-16 NOTE — PROGRESS NOTES
"Pain Management Clinic Note     Chief Complaint: back pain and right leg weakness   Referred by: orthopedic surgery     History Of Present Illness  Liana Man is a 88 y.o. female with a past medical history of chronic back pain s/p kyphoplasty (T12 and L1) and interspinous spacer (L5-S1) done in [facility] on eliquis who presents as a new patient evaluation for chronic low back pain.     Her main complaint is axial right-sided low back pain which she states sometimes radiates into her right shoulder.  She previously had right leg radicular symptoms, but has since been going to chiropractor which has resolved right leg symptoms. She has a longstanding history of chronic low back pain and has received multiple injections as well as T12 and L1 kyphoplasties and L5-S1 interspinous spacer all done in [place].  She goes to [place] 6 months out of the year but is from the [facility].  She is mainly bothered by not being able to stand for any length of time because of her back pain.  She requires a rollator to ambulate, and when without assistive device she has to stoop forward.  She denies any leg symptoms but complains of bilateral lower extremity \"weakness\".  She has seen an orthopedic surgeon, Dr. Phillips, who recommends conservative management.  She has previously done PT in the past,  but not recently. She is not engaging in home exercises at this moment, but is willing to start doing exercise again. She has received multiple epidurals in the past with good relief, but states they slowly lost efficacy overtime.     Most recent imaging MRI L-spine shows moderate central stenosis at L4-5.     Previous treatments : multiple spine injections in the past, kyphoplasty (T12, L1), interspinous spacer L4-5.    The pain causes significant stress in the patient's life, specifically interferes with general activity, mood, walking ability, ability to perform tasks at home and/or work. Denies any bowel or " bladder incontinence, saddle anesthesia, worsening pain, weakness or falls.     Past Medical History  She has a past medical history of Breast cancer (CMS/[facility]), Other abnormal and inconclusive findings on diagnostic imaging of breast, Other conditions influencing health status, Other conditions influencing health status, Other conditions influencing health status, Pain in unspecified hip (12/17/2015), Pain in unspecified hip (06/15/2015), Personal history of malignant neoplasm of breast (12/17/2015), and Personal history of other specified conditions.    Surgical History  She has a past surgical history that includes Breast lumpectomy (08/22/2013); Nasal septum surgery (08/22/2013); Carpal tunnel release (08/22/2013); Other surgical history (08/22/2013); Other surgical history (09/10/2019); Other surgical history (09/10/2019); and Other surgical history (12/17/2015).     Social History  She reports that she has quit smoking. Her smoking use included cigarettes. She has never used smokeless tobacco. She reports current alcohol use. She reports that she does not use drugs.    Family History  Family History   Problem Relation Name Age of Onset    Breast cancer Sister          Allergies  Oxycodone-acetaminophen, Oxycodone, and Tramadol    Review of Symptoms:   Constitutional: Negative for chills, diaphoresis or fever  HENT: Negative for neck swelling  Eyes:.  Negative for eye pain  Respiratory:.  Negative for cough, shortness of breath or wheezing    Cardiovascular:.  Negative for chest pain or palpitations  Gastrointestinal:.  Negative for abdominal pain, nausea and vomiting  Genitourinary:.  Negative for urgency  Musculoskeletal:  Positive for back pain. Positive for joint pain. Denies falls within the past 3 months.  Skin: Negative for wounds or itching   Neurological: Negative for dizziness, seizures, loss of consciousness and weakness  Endo/Heme/Allergies: Does not bruise/bleed easily  Psychiatric/Behavioral:  Negative for depression. The patient does not appear anxious.       PHYSICAL EXAM  Vitals signs reviewed  Constitutional:       General: Not in acute distress     Appearance: Normal appearance. Not ill-appearing.  HENT:     Head: Normocephalic and atraumatic  Eyes:     Conjunctiva/sclera: Conjunctivae normal  Cardiovascular:     Rate and Rhythm: Normal rate and regular rhythm  Pulmonary:     Effort: No respiratory distress  Abdominal:     Palpations: Abdomen is soft  Musculoskeletal: POWELL  Skin:     General: Skin is warm and dry  Neurological:     General: No focal deficit present  Psychiatric:         Mood and Affect: Mood normal         Behavior: Behavior normal    Advanced Exam   Inspection: No gross deformities, no surgical scars  Palpation: + tenderness of patient of lumbar midline, +ttp of right lumbar paraspinals, no ttp to bilateral SI joints  ROM: Normal range of motion of the lumbar flexion extension  Motor: 5/5 strength upper and lower extremities  Sensory: Negative for sensory abnormalities in upper and lower extremities  Reflexes: 2+ reflexes bilateral upper and lower extremities, negative clonus, negative kaye's   Lumbar: Negative straight leg raising bilaterally  Sacral: Negative Seamus     Last Recorded Vitals  There were no vitals taken for this visit.    Relevant Results  Current Outpatient Medications   Medication Instructions    acetaminophen (TYLENOL) 500 mg, oral    albuterol 90 mcg/actuation inhaler 2 puffs, inhalation, Every 4 hours PRN    apixaban (ELIQUIS) 5 mg, oral, 2 times daily    calcium carbonate-vitamin D3 500 mg-5 mcg (200 unit) tablet 1 tablet, oral, Daily RT    clobetasol (Temovate) 0.05 % external solution Topical, 2 times daily    cyanocobalamin (VITAMIN B-12) 1,000 mcg, intramuscular, Every 30 days    dilTIAZem CD (CARDIZEM CD) 120 mg, oral, Every 12 hours    ferrous sulfate (325 mg ferrous sulfate) 325 mg, oral, Daily with breakfast    fluticasone (Flonase) 50 mcg/actuation  "nasal spray 2 sprays, Each Nostril, Daily    hydrALAZINE (APRESOLINE) 25 mg, oral, Nightly    hydrOXYzine HCL (ATARAX) 25 mg, oral, Every 8 hours PRN    losartan (COZAAR) 50 mg, oral, Daily, as directed    magnesium oxide (Mag-Ox) 400 mg (241.3 mg magnesium) tablet 1 tablet, oral, Daily    omega 3-dha-epa-fish oil 300-1,000 mg capsule,delayed release(DR/EC) oral, 2 times daily    pantoprazole (PROTONIX) 40 mg, oral, Daily    sennosides-docusate sodium (Myah-Colace) 8.6-50 mg tablet 2 tablets, oral, Daily PRN    syringe with needle 3 mL 25 gauge x 1\" syringe 1 each, miscellaneous, Every 30 days         MR lumbar spine wo IV contrast 05/06/2022    Narrative  Report generated by voice recognition software by Marcelo Guzman DO on 5/6/2022 5:25 PM.    EXAMINATION:  MRI SPINE LUMBAR W/O CONTRAST    EXAM DATE:  5/6/2022 5:17 PM    CLINICAL HISTORY:  radiculopathy lumbar region  radiculopathy, lumbar region  radiculopathy, lumbar region  Patient Reported Symptom: no  Diagnosis Info: Radiculopathy of lumbar region    ADDITIONAL CLINICAL HISTORY:  None.    COMPARISON:  No comparisons were made when reading this study.    TECHNIQUE:  Multiplanar MR pulse sequences were performed through the lumbar spine without the use of intravenous contrast.    FINDINGS:  Mild curvature thoracolumbar spine. T12 and L1 vertebroplasty is noted. Chronic compression deformity T11 and T12. The conus is unremarkable. X-Stop device L4-L5 spinous process. Endplate bone marrow edema at L1-L2.    Segmental Analysis:    L1-L2:  5 mm retrolisthesis at L1. Advanced degenerative endplate changes seen. Facet arthropathy and ligamentum flavum hypertrophy is noted. Diffuse disc bulge, findings causing mild to moderate spinal canal stenosis. There is moderate foraminal stenosis on the right with moderate to severe on the left. No focal disc herniation seen.    L2-L3:  Mild disc bulge is noted. Facet arthropathy with ligamentum flavum hypertrophy, findings " causing mild spinal canal stenosis. Mild to moderate foraminal stenosis bilaterally. No focal disc herniation is seen.    L3-L4:  Facet arthropathy with ligamentum flavum hypertrophy causing mild to moderate spinal canal stenosis. Mild foraminal narrowing bilaterally. No disc herniation or significant spinal canal or foraminal stenosis.    L4-L5:  Facet arthropathy with ligamentum flavum hypertrophy. Diffuse disc bulge with disc-osteophyte the right foramen causing moderate to severe foraminal stenosis on the right. Moderate spinal canal stenosis with mild to moderate left foraminal stenosis. No focal disc herniation seen.    L5-S1:  Diffuse disc bulge with disc-osteophyte at the foramen bilaterally. Facet arthropathy with moderate foraminal stenosis on the right and mild to moderate on the left. No significant spinal canal stenosis. No focal disc herniation seen.    Impression  1. Multilevel degenerative changes and disc disease causing spinal canal and foraminal stenosis detailed above.     No image results found.       1. Low back pain, unspecified back pain laterality, unspecified chronicity, unspecified whether sciatica present  Referral to Pain Medicine      2. Lumbar degenerative disc disease  Referral to Pain Medicine           ASSESSMENT/PLAN  Liana Man is a 88 y.o. female with a past medical history of chronic back pain s/p kyphoplasty (T12 and L1) and interspinous spacer (L5-S1) done in [place] and [facility] on eliquis who presents for evaluation of chronic axial, right-sided low back pain.  The patient previously had right leg radicular symptoms, but it has since resolved since doing chiropractic treatment.  Her back pain is severely limiting her ability to walk, essentially she is not able to go any length of time without needing to lean forward on her rollator.  She has received multiple spine injections in the past in [place] with good relief and is amenable to a repeat epidural steroid injection.   She has been able to hold her Eliquis in the past for procedures, but she will confirm with her cardiologist that she can hold her Eliquis 5 days prior to an injection.  MRI reveals lumbar spinal stenosis at L4-5 due to disc bulge and ligamentum flavum hypertrophy as well as severe right L4-5 neural foraminal narrowing. She has completed PT in the past and has tried Tramadol and Tylenol for her pain.       Based on history, available imaging and physical exam, the patient's primary pain etiology is likely due to lumbar spinal stenosis and lumbar radiculitis.        Our plan is as follows:  -Once patient obtains clearance from cardiologist to hold Eliquis we will schedule her for a L4-5 right biased interlaminar lumbar epidural steroid injection.  - We will refer the patient physical therapy: A referral for physical therapy was provided to the patient. We discussed initiating physical therapy to help manage the patient's painful condition. The patient was counseled that muscle strengthening will improve the long term prognosis in regards to pain and may also help increase range of motion and mobility. The patient's questions were answered and he was agreeable to this course.   - Continue pain medications as prescribed. Consider trial of gabapentin nightly if no improvement.      The patient was invited to contact us back anytime with any questions or concerns and follow-up with us in the office as needed.       Sherry Mendez DO   Pain fellow

## 2024-01-17 DIAGNOSIS — M54.9 OTHER CHRONIC BACK PAIN: Primary | ICD-10-CM

## 2024-01-17 DIAGNOSIS — G89.29 OTHER CHRONIC BACK PAIN: Primary | ICD-10-CM

## 2024-01-18 RX ORDER — GABAPENTIN 100 MG/1
CAPSULE ORAL
Qty: 90 CAPSULE | Refills: 0 | Status: SHIPPED | OUTPATIENT
Start: 2024-01-18 | End: 2024-06-11 | Stop reason: SDUPTHER

## 2024-01-29 ENCOUNTER — TELEPHONE (OUTPATIENT)
Dept: PAIN MEDICINE | Facility: CLINIC | Age: 89
End: 2024-01-29
Payer: MEDICARE

## 2024-01-29 NOTE — TELEPHONE ENCOUNTER
Spoke to physician office; [facility] at [phone]. Regarding getting clearance for holding Eliquis for 5 day. The office previously had faxed over clearance for only to hold it 48 hrs. I informed the office she would need to hold medication for 5 days prior to injection. Office will speak to the cardiologist and send over message if okay to hold for 5 days.

## 2024-02-12 ENCOUNTER — OFFICE VISIT (OUTPATIENT)
Dept: PRIMARY CARE | Facility: CLINIC | Age: 89
End: 2024-02-12
Payer: MEDICARE

## 2024-02-12 VITALS
RESPIRATION RATE: 14 BRPM | BODY MASS INDEX: 26.13 KG/M2 | SYSTOLIC BLOOD PRESSURE: 130 MMHG | TEMPERATURE: 97.2 F | DIASTOLIC BLOOD PRESSURE: 62 MMHG | HEART RATE: 70 BPM | OXYGEN SATURATION: 95 % | HEIGHT: 62 IN | WEIGHT: 142 LBS

## 2024-02-12 DIAGNOSIS — D64.9 ANEMIA, UNSPECIFIED TYPE: ICD-10-CM

## 2024-02-12 DIAGNOSIS — C50.911 MALIGNANT NEOPLASM OF RIGHT FEMALE BREAST, UNSPECIFIED ESTROGEN RECEPTOR STATUS, UNSPECIFIED SITE OF BREAST (MULTI): ICD-10-CM

## 2024-02-12 DIAGNOSIS — I48.0 PAROXYSMAL ATRIAL FIBRILLATION WITH RVR (MULTI): ICD-10-CM

## 2024-02-12 DIAGNOSIS — J81.0 ACUTE PULMONARY EDEMA (MULTI): ICD-10-CM

## 2024-02-12 DIAGNOSIS — I50.32 CHRONIC DIASTOLIC (CONGESTIVE) HEART FAILURE (MULTI): ICD-10-CM

## 2024-02-12 DIAGNOSIS — R09.81 CHRONIC NASAL CONGESTION: ICD-10-CM

## 2024-02-12 DIAGNOSIS — N18.31 STAGE 3A CHRONIC KIDNEY DISEASE (MULTI): ICD-10-CM

## 2024-02-12 DIAGNOSIS — R73.9 HYPERGLYCEMIA: ICD-10-CM

## 2024-02-12 DIAGNOSIS — R53.83 OTHER FATIGUE: Primary | ICD-10-CM

## 2024-02-12 PROBLEM — L03.90 CELLULITIS: Status: RESOLVED | Noted: 2023-05-19 | Resolved: 2024-02-12

## 2024-02-12 PROCEDURE — 1123F ACP DISCUSS/DSCN MKR DOCD: CPT | Performed by: INTERNAL MEDICINE

## 2024-02-12 PROCEDURE — 3075F SYST BP GE 130 - 139MM HG: CPT | Performed by: INTERNAL MEDICINE

## 2024-02-12 PROCEDURE — 1160F RVW MEDS BY RX/DR IN RCRD: CPT | Performed by: INTERNAL MEDICINE

## 2024-02-12 PROCEDURE — 1157F ADVNC CARE PLAN IN RCRD: CPT | Performed by: INTERNAL MEDICINE

## 2024-02-12 PROCEDURE — 1125F AMNT PAIN NOTED PAIN PRSNT: CPT | Performed by: INTERNAL MEDICINE

## 2024-02-12 PROCEDURE — 3078F DIAST BP <80 MM HG: CPT | Performed by: INTERNAL MEDICINE

## 2024-02-12 PROCEDURE — 99214 OFFICE O/P EST MOD 30 MIN: CPT | Performed by: INTERNAL MEDICINE

## 2024-02-12 PROCEDURE — 1036F TOBACCO NON-USER: CPT | Performed by: INTERNAL MEDICINE

## 2024-02-12 PROCEDURE — 1159F MED LIST DOCD IN RCRD: CPT | Performed by: INTERNAL MEDICINE

## 2024-02-12 RX ORDER — AZELASTINE 1 MG/ML
1 SPRAY, METERED NASAL 2 TIMES DAILY
Qty: 30 ML | Refills: 12 | Status: SHIPPED | OUTPATIENT
Start: 2024-02-12 | End: 2024-02-13 | Stop reason: SDUPTHER

## 2024-02-12 NOTE — PROGRESS NOTES
"Subjective    Liana Man is a 88 y.o. female who presents for No chief complaint on file..  HPI  C/o sinus congestion started months ago  Worse at night  Has tried decongestants, vicks, zicam, flonase  Zicam helps., but unable to take for longer than 3 days  Just plugged. No sore throat or sinus draiange. No cough  No fever no earache.   She uses afrin (zicam version) and that is the only thing that helps. She has hx of being addicted to that.   Scheduled for back injection on Wednesday.         Review of Systems   All other systems reviewed and are negative.        Objective     /62 (BP Location: Left arm, Patient Position: Sitting, BP Cuff Size: Adult)   Pulse 70   Temp 36.2 °C (97.2 °F) (Skin)   Resp 14   Ht 1.575 m (5' 2\")   Wt 64.4 kg (142 lb)   SpO2 95%   BMI 25.97 kg/m²    Physical Exam  Vitals reviewed.   Constitutional:       General: She is not in acute distress.     Appearance: Normal appearance.   HENT:      Nose: Congestion and rhinorrhea present. Rhinorrhea is clear.   Cardiovascular:      Rate and Rhythm: Normal rate and regular rhythm.      Pulses: Normal pulses.      Heart sounds: Normal heart sounds.   Pulmonary:      Effort: Pulmonary effort is normal.      Breath sounds: Normal breath sounds.   Abdominal:      Tenderness: There is no abdominal tenderness.   Musculoskeletal:         General: No swelling.   Skin:     General: Skin is warm and dry.   Neurological:      Mental Status: She is alert.       Health Maintenance Due   Topic Date Due    DTaP/Tdap/Td Vaccines (1 - Tdap) 09/15/2015    Echocardiogram  01/23/2023    COVID-19 Vaccine (3 - 2023-24 season) 09/01/2023          Assessment/Plan   Problem List Items Addressed This Visit       Anemia    Relevant Orders    Iron and TIBC    CKD (chronic kidney disease), stage III (CMS/HCC)    Relevant Orders    Basic Metabolic Panel    Fatigue - Primary    Relevant Orders    CBC    TSH with reflex to Free T4 if abnormal    Iron and " TIBC    Paroxysmal atrial fibrillation with RVR (CMS/HCC)    Acute pulmonary edema (CMS/HCC)    Chronic diastolic (congestive) heart failure (CMS/HCC)    Malignant neoplasm of right female breast, unspecified estrogen receptor status, unspecified site of breast (CMS/HCC)     Other Visit Diagnoses       Chronic nasal congestion        Relevant Medications    azelastine (Astelin) 137 mcg (0.1 %) nasal spray    Other Relevant Orders    Referral to ENT    Hyperglycemia        Relevant Orders    Hemoglobin A1C        Stable based on symptoms. Continue established treatment plan and follow up at least yearly  Would recommend limited use of the afrin.   Recommend using nasal steroid recommend flonase sensimist and astelin daily  Refer to ent.   Check labs.   Call  with any problems or questions.   Follow up as needed

## 2024-02-13 ENCOUNTER — LAB (OUTPATIENT)
Dept: LAB | Facility: LAB | Age: 89
End: 2024-02-13
Payer: MEDICARE

## 2024-02-13 DIAGNOSIS — R73.9 HYPERGLYCEMIA: ICD-10-CM

## 2024-02-13 DIAGNOSIS — R09.81 CHRONIC NASAL CONGESTION: ICD-10-CM

## 2024-02-13 DIAGNOSIS — N18.31 STAGE 3A CHRONIC KIDNEY DISEASE (MULTI): ICD-10-CM

## 2024-02-13 DIAGNOSIS — R53.83 OTHER FATIGUE: ICD-10-CM

## 2024-02-13 DIAGNOSIS — D64.9 ANEMIA, UNSPECIFIED TYPE: ICD-10-CM

## 2024-02-13 LAB
ANION GAP SERPL CALC-SCNC: 11 MMOL/L (ref 10–20)
BUN SERPL-MCNC: 29 MG/DL (ref 6–23)
CALCIUM SERPL-MCNC: 9.7 MG/DL (ref 8.6–10.3)
CHLORIDE SERPL-SCNC: 98 MMOL/L (ref 98–107)
CO2 SERPL-SCNC: 32 MMOL/L (ref 21–32)
CREAT SERPL-MCNC: 0.97 MG/DL (ref 0.5–1.05)
EGFRCR SERPLBLD CKD-EPI 2021: 57 ML/MIN/1.73M*2
ERYTHROCYTE [DISTWIDTH] IN BLOOD BY AUTOMATED COUNT: 14.4 % (ref 11.5–14.5)
EST. AVERAGE GLUCOSE BLD GHB EST-MCNC: 114 MG/DL
GLUCOSE SERPL-MCNC: 83 MG/DL (ref 74–99)
HBA1C MFR BLD: 5.6 %
HCT VFR BLD AUTO: 35.3 % (ref 36–46)
HGB BLD-MCNC: 10.5 G/DL (ref 12–16)
IRON SATN MFR SERPL: 7 % (ref 25–45)
IRON SERPL-MCNC: 27 UG/DL (ref 35–150)
MCH RBC QN AUTO: 25.7 PG (ref 26–34)
MCHC RBC AUTO-ENTMCNC: 29.7 G/DL (ref 32–36)
MCV RBC AUTO: 86 FL (ref 80–100)
NRBC BLD-RTO: 0 /100 WBCS (ref 0–0)
PLATELET # BLD AUTO: 305 X10*3/UL (ref 150–450)
POTASSIUM SERPL-SCNC: 4.1 MMOL/L (ref 3.5–5.3)
RBC # BLD AUTO: 4.09 X10*6/UL (ref 4–5.2)
SODIUM SERPL-SCNC: 137 MMOL/L (ref 136–145)
TIBC SERPL-MCNC: 381 UG/DL (ref 240–445)
TSH SERPL-ACNC: 1.4 MIU/L (ref 0.44–3.98)
UIBC SERPL-MCNC: 354 UG/DL (ref 110–370)
WBC # BLD AUTO: 7.9 X10*3/UL (ref 4.4–11.3)

## 2024-02-13 PROCEDURE — 80048 BASIC METABOLIC PNL TOTAL CA: CPT

## 2024-02-13 PROCEDURE — 83550 IRON BINDING TEST: CPT

## 2024-02-13 PROCEDURE — 83540 ASSAY OF IRON: CPT

## 2024-02-13 PROCEDURE — 36415 COLL VENOUS BLD VENIPUNCTURE: CPT

## 2024-02-13 PROCEDURE — 84443 ASSAY THYROID STIM HORMONE: CPT

## 2024-02-13 PROCEDURE — 85027 COMPLETE CBC AUTOMATED: CPT

## 2024-02-13 PROCEDURE — 83036 HEMOGLOBIN GLYCOSYLATED A1C: CPT

## 2024-02-13 RX ORDER — AZELASTINE 1 MG/ML
1 SPRAY, METERED NASAL 2 TIMES DAILY
Qty: 90 ML | Refills: 3 | Status: SHIPPED | OUTPATIENT
Start: 2024-02-13 | End: 2025-02-12

## 2024-02-14 ENCOUNTER — TELEPHONE (OUTPATIENT)
Dept: PRIMARY CARE | Facility: CLINIC | Age: 89
End: 2024-02-14
Payer: MEDICARE

## 2024-02-14 ENCOUNTER — DOCUMENTATION (OUTPATIENT)
Dept: INFUSION THERAPY | Facility: CLINIC | Age: 89
End: 2024-02-14
Payer: MEDICARE

## 2024-02-14 DIAGNOSIS — D50.0 IRON DEFICIENCY ANEMIA DUE TO CHRONIC BLOOD LOSS: Primary | ICD-10-CM

## 2024-02-14 DIAGNOSIS — K21.9 GASTROESOPHAGEAL REFLUX DISEASE, UNSPECIFIED WHETHER ESOPHAGITIS PRESENT: ICD-10-CM

## 2024-02-14 DIAGNOSIS — D50.8 OTHER IRON DEFICIENCY ANEMIA: ICD-10-CM

## 2024-02-14 RX ORDER — ALBUTEROL SULFATE 0.83 MG/ML
3 SOLUTION RESPIRATORY (INHALATION) AS NEEDED
Status: CANCELLED | OUTPATIENT
Start: 2024-02-14

## 2024-02-14 RX ORDER — PANTOPRAZOLE SODIUM 40 MG/1
40 TABLET, DELAYED RELEASE ORAL DAILY
Qty: 90 TABLET | Refills: 3 | Status: SHIPPED | OUTPATIENT
Start: 2024-02-14

## 2024-02-14 RX ORDER — FAMOTIDINE 10 MG/ML
20 INJECTION INTRAVENOUS ONCE AS NEEDED
Status: CANCELLED | OUTPATIENT
Start: 2024-02-14

## 2024-02-14 RX ORDER — EPINEPHRINE 0.3 MG/.3ML
0.3 INJECTION SUBCUTANEOUS EVERY 5 MIN PRN
Status: CANCELLED | OUTPATIENT
Start: 2024-02-14

## 2024-02-14 RX ORDER — DIPHENHYDRAMINE HYDROCHLORIDE 50 MG/ML
50 INJECTION INTRAMUSCULAR; INTRAVENOUS AS NEEDED
Status: CANCELLED | OUTPATIENT
Start: 2024-02-14

## 2024-02-14 NOTE — TELEPHONE ENCOUNTER
----- Message from Brooklyn Henriquez DO sent at 2/14/2024  8:55 AM EST -----  Can you set up iv iron

## 2024-02-14 NOTE — PROGRESS NOTES
Patient to be scheduled for acute venofer infusions.   For Diagnosis: Iron Deficiency Anemia    Labs…  Iron Studies completed on:   Lab Results   Component Value Date    IRON 27 (L) 02/13/2024    TIBC 381 02/13/2024      Urine Hcg test ordered?  No (If female pt <60 years of age and with reproductive capability)  (If urine Hcg test ordered please instruct pt upon scheduling to drink 32 ounces of water 1 hour before arrival so bladder is full for needed urine sample)    Last infusion received: N/A (if continuation)    Due: Anytime      Patient can only come in on Fridays due to transportation issues. Patient will be scheduled every Friday for total of 5 doses to accommodate patient needs.     This result meets treatment criteria.

## 2024-02-14 NOTE — TELEPHONE ENCOUNTER
I spoke with Liana after I scheduled her appt this morning at [place]. She told me that she wanted [facility] as it is right across the street from her. We rescheduled her for 03/13/2024 with [place]aitlynn [place] [facility] [place] I spoke with her and provided her with her appt information.

## 2024-02-14 NOTE — TELEPHONE ENCOUNTER
Patient lm  She saw GI about  2 years ago in [place]. --     Are you able to schedule for Gi?    She is scheduled at [place] but prefers  as its closer.

## 2024-02-14 NOTE — RESULT ENCOUNTER NOTE
He has iron deficiency anemia. She cannot tolerate oral iron can barely get in one a day due to constpation.   Please set up iv iron.   I called in protonix for her. She has no sx of bleeding.   Referred to gastro

## 2024-02-14 NOTE — TELEPHONE ENCOUNTER
----- Message from Brooklyn Henriquez DO sent at 2/14/2024  9:59 AM EST -----  He has iron deficiency anemia. She cannot tolerate oral iron can barely get in one a day due to constpation.   Please set up iv iron.   I called in protonix for her. She has no sx of bleeding.   Referred to gastro

## 2024-02-15 ENCOUNTER — TELEPHONE (OUTPATIENT)
Dept: PRIMARY CARE | Facility: CLINIC | Age: 89
End: 2024-02-15
Payer: MEDICARE

## 2024-02-15 NOTE — TELEPHONE ENCOUNTER
El is calling and she is concerned about how far away the iron infusion is scheduled out. She would like a call back at [phone].  Please advise.

## 2024-02-19 ENCOUNTER — HOSPITAL ENCOUNTER (OUTPATIENT)
Dept: RADIOLOGY | Facility: CLINIC | Age: 89
Discharge: HOME | End: 2024-02-19
Payer: MEDICARE

## 2024-02-19 ENCOUNTER — HOSPITAL ENCOUNTER (OUTPATIENT)
Dept: OPERATING ROOM | Facility: CLINIC | Age: 89
Discharge: HOME | End: 2024-02-19
Payer: MEDICARE

## 2024-02-19 VITALS
TEMPERATURE: 97.9 F | HEIGHT: 62 IN | BODY MASS INDEX: 26.37 KG/M2 | WEIGHT: 143.3 LBS | DIASTOLIC BLOOD PRESSURE: 75 MMHG | RESPIRATION RATE: 16 BRPM | OXYGEN SATURATION: 94 % | HEART RATE: 73 BPM | SYSTOLIC BLOOD PRESSURE: 160 MMHG

## 2024-02-19 DIAGNOSIS — G89.29 OTHER CHRONIC BACK PAIN: ICD-10-CM

## 2024-02-19 DIAGNOSIS — M54.9 OTHER CHRONIC BACK PAIN: ICD-10-CM

## 2024-02-19 PROCEDURE — 62323 NJX INTERLAMINAR LMBR/SAC: CPT | Performed by: PAIN MEDICINE

## 2024-02-19 PROCEDURE — 2500000005 HC RX 250 GENERAL PHARMACY W/O HCPCS: Performed by: PAIN MEDICINE

## 2024-02-19 PROCEDURE — 2550000001 HC RX 255 CONTRASTS: Performed by: PAIN MEDICINE

## 2024-02-19 PROCEDURE — 2500000004 HC RX 250 GENERAL PHARMACY W/ HCPCS (ALT 636 FOR OP/ED): Performed by: PAIN MEDICINE

## 2024-02-19 RX ORDER — SODIUM BICARBONATE 42 MG/ML
INJECTION, SOLUTION INTRAVENOUS AS NEEDED
Status: COMPLETED | OUTPATIENT
Start: 2024-02-19 | End: 2024-02-19

## 2024-02-19 RX ORDER — LIDOCAINE HYDROCHLORIDE 5 MG/ML
INJECTION, SOLUTION INFILTRATION; PERINEURAL AS NEEDED
Status: COMPLETED | OUTPATIENT
Start: 2024-02-19 | End: 2024-02-19

## 2024-02-19 RX ORDER — SYRINGE WITH NEEDLE, 1 ML 25GX5/8"
SYRINGE, EMPTY DISPOSABLE MISCELLANEOUS
COMMUNITY
Start: 2023-06-05

## 2024-02-19 RX ORDER — DEXAMETHASONE SODIUM PHOSPHATE 100 MG/10ML
INJECTION INTRAMUSCULAR; INTRAVENOUS AS NEEDED
Status: COMPLETED | OUTPATIENT
Start: 2024-02-19 | End: 2024-02-19

## 2024-02-19 RX ORDER — MIDAZOLAM HYDROCHLORIDE 1 MG/ML
INJECTION INTRAMUSCULAR; INTRAVENOUS AS NEEDED
Status: COMPLETED | OUTPATIENT
Start: 2024-02-19 | End: 2024-02-19

## 2024-02-19 RX ADMIN — DEXAMETHASONE SODIUM PHOSPHATE 10 MG: 10 INJECTION INTRAMUSCULAR; INTRAVENOUS at 08:35

## 2024-02-19 RX ADMIN — IOHEXOL 10 ML: 240 INJECTION, SOLUTION INTRATHECAL; INTRAVASCULAR; INTRAVENOUS; ORAL at 08:36

## 2024-02-19 RX ADMIN — MIDAZOLAM HYDROCHLORIDE 1 MG: 1 INJECTION, SOLUTION INTRAMUSCULAR; INTRAVENOUS at 08:32

## 2024-02-19 RX ADMIN — SODIUM BICARBONATE 1 MEQ: 42 INJECTION, SOLUTION INTRAVENOUS at 08:35

## 2024-02-19 RX ADMIN — LIDOCAINE HYDROCHLORIDE 10 ML: 5 INJECTION, SOLUTION INFILTRATION; PERINEURAL at 08:33

## 2024-02-19 ASSESSMENT — COLUMBIA-SUICIDE SEVERITY RATING SCALE - C-SSRS
1. IN THE PAST MONTH, HAVE YOU WISHED YOU WERE DEAD OR WISHED YOU COULD GO TO SLEEP AND NOT WAKE UP?: NO
6. HAVE YOU EVER DONE ANYTHING, STARTED TO DO ANYTHING, OR PREPARED TO DO ANYTHING TO END YOUR LIFE?: NO
2. HAVE YOU ACTUALLY HAD ANY THOUGHTS OF KILLING YOURSELF?: NO

## 2024-02-19 ASSESSMENT — PAIN SCALES - GENERAL
PAINLEVEL_OUTOF10: 0 - NO PAIN
PAINLEVEL_OUTOF10: 0 - NO PAIN
PAINLEVEL_OUTOF10: 5 - MODERATE PAIN

## 2024-02-19 ASSESSMENT — PAIN DESCRIPTION - DESCRIPTORS: DESCRIPTORS: ACHING

## 2024-02-19 ASSESSMENT — PAIN - FUNCTIONAL ASSESSMENT
PAIN_FUNCTIONAL_ASSESSMENT: 0-10

## 2024-02-19 NOTE — H&P
HISTORY AND PHYSICAL    History Of Present Illness  Liana Man is a 88 y.o. female presenting with chronic pain.  Here for interlaminar lumbar epidural steroid injection     she denies any recent antibiotic use or infections, she is on blood thinner. , she held eliquis for 7 days, and she denies contrast or local anesthetic allergies     Past Medical History  Past Medical History:   Diagnosis Date    A-fib (CMS/HCC)     Breast cancer (CMS/HCC)     GERD (gastroesophageal reflux disease)     HTN (hypertension)     Other abnormal and inconclusive findings on diagnostic imaging of breast     Mammogram abnormal    Other conditions influencing health status     L Breast Inf Med Quad Prev Diffuse Calcification Increased    Other conditions influencing health status     Malignant Female Breast Neoplasm Of The Upper Outer Quadrant    Other conditions influencing health status     Breast Cancer    Pain in unspecified hip 12/17/2015    Joint pain, hip    Pain in unspecified hip 06/15/2015    Hip pain    Personal history of malignant neoplasm of breast 12/17/2015    History of malignant neoplasm of breast    Personal history of other specified conditions     History of lymphadenopathy       Surgical History  Past Surgical History:   Procedure Laterality Date    BREAST LUMPECTOMY  08/22/2013    Right Breast Lumpectomy    CARPAL TUNNEL RELEASE  08/22/2013    Neuroplasty Decompression Median Nerve At Carpal Tunnel    NASAL SEPTUM SURGERY  08/22/2013    Nasal Septal Deviation Repair    OTHER SURGICAL HISTORY  08/22/2013    Hysteroscopy    OTHER SURGICAL HISTORY  09/10/2019    Shoulder replacement    OTHER SURGICAL HISTORY  09/10/2019    Shoulder surgery    OTHER SURGICAL HISTORY  12/17/2015    Reported Hx Of Hip Replacement - Left Side        Social History  She reports that she has quit smoking. Her smoking use included cigarettes. She has never used smokeless tobacco. She reports current alcohol use. She reports that she  "does not use drugs.    Family History  Family History   Problem Relation Name Age of Onset    Breast cancer Sister          Allergies  Oxycodone-acetaminophen, Oxycodone, and Tramadol    Review of Systems   12 point ROS done and negative except for the above.   Physical Exam     General: NAD, well groomed, well nourished  Eyes: Non-icteric sclera, EOMI  Ears, Nose, Mouth, and Throat: External ears and nose appear to be without deformity or rash. No lesions or masses noted. Hearing is grossly intact.   Neck: Trachea midline  Respiratory: Nonlabored breathing   Cardiovascular: No peripheral edema   Skin: No rashes or open lesions/ulcers identified on skin.    Last Recorded Vitals  Pulse 65, temperature 36.3 °C (97.3 °F), temperature source Temporal, resp. rate 16, height 1.575 m (5' 2\"), weight 65 kg (143 lb 4.8 oz), SpO2 97 %.    Relevant Results           Assessment/Plan       Risks, benefits, alternatives discussed. All questions answered to the best of my ability. Patient agrees to proceed.   -We will proceed with planned procedure        Og Carey MD  Anesthesia, PGY2  [facility]    "

## 2024-02-19 NOTE — OP NOTE
* No procedures listed * Operative Note     Date: 2024  [place] [facility] [place]    Name: Liana Man, : 1935, Age: 88 y.o., MRN: [id number], Sex: female    Diagnosis  * No Diagnosis Codes entered * * No Diagnosis Codes entered *     Procedures  Lumbar epidural injection of steroid     Surgeons   MD George    Resident/Fellow/Other Assistant:  Dr. Siddiqi    Procedure Summary  Anesthesia: Local and sedation  ASA: ASA status not filed in the log.  Anesthesia Staff: Anesthesiologist: Og Vazquez MD  Anesthesia Resident: Antonette Carey MD  Estimated Blood Loss: 0mL  Intra-op Medications: * Intraprocedure medication information is unavailable because the case start and end events have not been set *      Intraprocedure I/O Totals       None           Specimen: No specimens collected     Staff:   Circulator: Edna Zamorano RN  Scrub Person: Pushpa Liz RN; Cassie Espinal RN         Drains and/or Catheters: * None in log *    Tourniquet Times:         Implants:     Findings:     Indications: Liana Man is an 88 y.o. female who is having surgery for lumbar radiculopathy.     The patient was seen in the preoperative area. The risks, benefits, complications, treatment options, non-operative alternatives, expected recovery and outcomes were discussed with the patient. The possibilities of reaction to medication, pulmonary aspiration, injury to surrounding structures, bleeding, recurrent infection, the need for additional procedures, failure to diagnose a condition, and creating a complication requiring transfusion or operation were discussed with the patient. The patient concurred with the proposed plan, giving informed consent.  The site of surgery was properly noted/marked if necessary per policy. The patient has been actively warmed in preoperative area. Preoperative antibiotics are not indicated. Venous thrombosis prophylaxis are not indicated.    Procedure Details: Pt  identified in holding bay   Consented, IV in rt hand.  Patient was taken to OR one at [place] . Pt transferred to OR table.Regular monitors applied. Scrubbed in regular fashion. Sterile towels applied. By fluor guidance L4/5 identified epidural steroid space. Skin numbness by half percent lidocaine. Epidural needle inserted and directed by fluoro . Space identified using loss of resistance. Confirmed using contrast. 2 ml of lidocaine half percent and 10 mg dexamethasone injected. No complications               Complications:  None; patient tolerated the procedure well.    Disposition: PACU - hemodynamically stable.  Condition: stable         Additional Details:     Attending Attestation: I was present and scrubbed for the entire procedure.    MD George

## 2024-02-21 ENCOUNTER — INFUSION (OUTPATIENT)
Dept: INFUSION THERAPY | Facility: CLINIC | Age: 89
End: 2024-02-21
Payer: MEDICARE

## 2024-02-21 VITALS
RESPIRATION RATE: 18 BRPM | DIASTOLIC BLOOD PRESSURE: 65 MMHG | HEART RATE: 77 BPM | TEMPERATURE: 97.3 F | OXYGEN SATURATION: 97 % | SYSTOLIC BLOOD PRESSURE: 130 MMHG

## 2024-02-21 DIAGNOSIS — D50.8 OTHER IRON DEFICIENCY ANEMIA: ICD-10-CM

## 2024-02-21 PROCEDURE — 96365 THER/PROPH/DIAG IV INF INIT: CPT | Performed by: REGISTERED NURSE

## 2024-02-21 RX ORDER — EPINEPHRINE 0.3 MG/.3ML
0.3 INJECTION SUBCUTANEOUS EVERY 5 MIN PRN
Status: CANCELLED | OUTPATIENT
Start: 2024-02-24

## 2024-02-21 RX ORDER — DIPHENHYDRAMINE HYDROCHLORIDE 50 MG/ML
50 INJECTION INTRAMUSCULAR; INTRAVENOUS AS NEEDED
Status: CANCELLED | OUTPATIENT
Start: 2024-02-24

## 2024-02-21 RX ORDER — FAMOTIDINE 10 MG/ML
20 INJECTION INTRAVENOUS ONCE AS NEEDED
Status: CANCELLED | OUTPATIENT
Start: 2024-02-24

## 2024-02-21 RX ORDER — ALBUTEROL SULFATE 0.83 MG/ML
3 SOLUTION RESPIRATORY (INHALATION) AS NEEDED
Status: CANCELLED | OUTPATIENT
Start: 2024-02-24

## 2024-02-21 ASSESSMENT — ENCOUNTER SYMPTOMS
PSYCHIATRIC NEGATIVE: 1
RESPIRATORY NEGATIVE: 1
CARDIOVASCULAR NEGATIVE: 1
ENDOCRINE NEGATIVE: 1
CONSTITUTIONAL NEGATIVE: 1
GASTROINTESTINAL NEGATIVE: 1
HEMATOLOGIC/LYMPHATIC NEGATIVE: 1
EYES NEGATIVE: 1
NEUROLOGICAL NEGATIVE: 1

## 2024-02-21 NOTE — PROGRESS NOTES
[facility]   infusion Clinic Note   Date: 2024   Name: Liana Man  : 1935   MRN: [id number]         Reason for Visit: OP Infusion (PATIENT HERE FOR HER VENOFER 200MG INFUSION )         Visit Type: INFUSION       Ordered By: PAM VELAZQUEZ DO      Accompanied by: DAUGHTER      Diagnosis: Other iron deficiency anemia       Allergies:   Allergies as of 2024 - Reviewed 2024   Allergen Reaction Noted    Oxycodone-acetaminophen Other 2018    Oxycodone Other 01/15/2016    Tramadol Other 01/15/2016         Current Medications has a current medication list which includes the following prescription(s): acetaminophen, albuterol, apixaban, azelastine, bd luer-rae syringe, calcium carbonate-vitamin d3, clobetasol, cyanocobalamin, diltiazem cd, fluticasone, gabapentin, hydralazine, hydroxyzine hcl, losartan, magnesium oxide, omega 3-dha-epa-fish oil, pantoprazole, sennosides-docusate sodium, and syringe with needle.       Vitals:   Vitals:    24 1556   BP: 130/65   Pulse: 77   Resp: 18   Temp: 36.3 °C (97.3 °F)   SpO2: 97%             Infusion Pre-procedure Checklist:   - Allergies reviewed: yes   - Medications reviewed: yes       - Previous reaction to current treatment: no      Assess patient for the concerns below. Document provider notification as appropriate.  - Active or recent infection with/without current antibiotic use: no  - Recent or planned invasive dental work: no  - Recent or planned surgeries: no  - Recently received or plans to receive vaccinations: no  - Has treatment related toxicities: no  - Is pregnant:  n/a      Pain: 0   - Is the pain different from normal: n/a   - Is the pain tolerable: n/a   - Is your Doctor aware:  n/a      Labs: N/A         Fall Risk Screening: Brandie Fall Risk  History of Falling, Immediate or Within 3 Months: No  Secondary Diagnosis: No  Ambulatory Aid: Crutches/cane/walker  Intravenous  Therapy/Heparin Lock: No  Gait/Transferring: Weak  Mental Status: Oriented to own ability  Diego Fall Risk Score: 25       Review Of Systems:  Review of Systems   Constitutional: Negative.    HENT:  Negative.     Eyes: Negative.    Respiratory: Negative.     Cardiovascular: Negative.         H/O A-FIB   Gastrointestinal: Negative.    Endocrine: Negative.    Genitourinary: Negative.     Musculoskeletal:         USES A ROLLATOR TO AMBULATE   Skin: Negative.    Neurological: Negative.    Hematological: Negative.    Psychiatric/Behavioral: Negative.           Infusion Readiness:   - Assessment Concerns Related to Infusion: No  - Provider notified: n/a      Document Below Only If Indicated:   New Patient Education:            Treatment Conditions & Drug Specific Questions:          Weight Based Drug Calculations:    WEIGHT BASED DRUGS: NOT APPLICABLE / FLAT DOSE          Initiated By: Julio Lebron RN   Time: 5:11 PM     We administered iron sucrose.

## 2024-02-21 NOTE — PATIENT INSTRUCTIONS
Today you received:  VENOFER 200MG INFUSION 1/5    For:    1. Other iron deficiency anemia         Please read the  Medication Guide that was given to you and reviewed during todays visit.     (Tell all doctors including dentists that you are taking this medication)     Go to the emergency room or call 911 if:  -You have signs of allergic reaction:   o         Rash, hives, itching.   o         Swollen, blistered, peeling skin.   o         Swelling of face, lips, mouth, tongue or throat.   o         Tightness of chest, trouble breathing, swallowing or talking      Call your doctor:     - If IV / injection site gets red, warm, swollen, itchy or leaks fluid or pus.     (Leave dressing on your IV site for at least 2 hours and keep area clean and dry    - If you get sick or have symptoms of infection or are not feeling well for any reason.    (Wash your hands often, stay away from people who are sick)    - If you have side effects from your medication that do not go away or are bothersome.     (Refer to the teaching your nurse gave you for side effects to call your doctor about)     Common side effects may include:  stuffy nose, headache, feeling tired, muscle aches, upset stomach    - Before receiving any vaccines, Call the [facility] at [phone]     - You get sick, are on antibiotics, have had a recent vaccine, have surgery or dental work and your doctor wants your visit rescheduled.    - You need to cancel and reschedule your visit for any reason. Call at least 2 days before your visit if you need to cancel.     - Your insurance changes before your next visit.    (We will need to get approval from your new insurance. This can take up to two weeks.)     The [facility] is opened Monday thru Friday 8am-8pm and Saturday 8am-4:30pm. We are closed on holidays.     Voice mail will take your call if the center is closed. If you leave a message please allow 24 hours for a call back during  weekdays. If you leave a message on a weekend/holiday, we will call you back the next business day.

## 2024-02-26 ENCOUNTER — INFUSION (OUTPATIENT)
Dept: INFUSION THERAPY | Facility: CLINIC | Age: 89
End: 2024-02-26
Payer: MEDICARE

## 2024-02-26 VITALS
DIASTOLIC BLOOD PRESSURE: 56 MMHG | SYSTOLIC BLOOD PRESSURE: 114 MMHG | HEART RATE: 79 BPM | TEMPERATURE: 99 F | RESPIRATION RATE: 18 BRPM | OXYGEN SATURATION: 95 %

## 2024-02-26 DIAGNOSIS — D50.8 OTHER IRON DEFICIENCY ANEMIA: ICD-10-CM

## 2024-02-26 PROCEDURE — 96365 THER/PROPH/DIAG IV INF INIT: CPT | Performed by: REGISTERED NURSE

## 2024-02-26 RX ORDER — FAMOTIDINE 10 MG/ML
20 INJECTION INTRAVENOUS ONCE AS NEEDED
Status: CANCELLED | OUTPATIENT
Start: 2024-02-27

## 2024-02-26 RX ORDER — EPINEPHRINE 0.3 MG/.3ML
0.3 INJECTION SUBCUTANEOUS EVERY 5 MIN PRN
Status: CANCELLED | OUTPATIENT
Start: 2024-02-27

## 2024-02-26 RX ORDER — DIPHENHYDRAMINE HYDROCHLORIDE 50 MG/ML
50 INJECTION INTRAMUSCULAR; INTRAVENOUS AS NEEDED
Status: CANCELLED | OUTPATIENT
Start: 2024-02-27

## 2024-02-26 RX ORDER — ALBUTEROL SULFATE 0.83 MG/ML
3 SOLUTION RESPIRATORY (INHALATION) AS NEEDED
Status: CANCELLED | OUTPATIENT
Start: 2024-02-27

## 2024-02-26 ASSESSMENT — ENCOUNTER SYMPTOMS
NEUROLOGICAL NEGATIVE: 1
RESPIRATORY NEGATIVE: 1
CARDIOVASCULAR NEGATIVE: 1
GASTROINTESTINAL NEGATIVE: 1
ENDOCRINE NEGATIVE: 1
EYES NEGATIVE: 1
HEMATOLOGIC/LYMPHATIC NEGATIVE: 1
MUSCULOSKELETAL NEGATIVE: 1
CONSTITUTIONAL NEGATIVE: 1

## 2024-02-26 ASSESSMENT — PAIN SCALES - GENERAL: PAINLEVEL: 7

## 2024-02-26 NOTE — PATIENT INSTRUCTIONS
Today :We administered iron sucrose (Venofer) 200 mg in sodium chloride 0.9% 100 mL IV.     For:   1. Other iron deficiency anemia         Your next appointment is due in:        Please read the  Medication Guide that was given to you and reviewed during todays visit.     (Tell all doctors including dentists that you are taking this medication)     Go to the emergency room or call 911 if:  -You have signs of allergic reaction:   -Rash, hives, itching.   -Swollen, blistered, peeling skin.   -Swelling of face, lips, mouth, tongue or throat.   -Tightness of chest, trouble breathing, swallowing or talking     Call your doctor:  - If IV / injection site gets red, warm, swollen, itchy or leaks fluid or pus.     (Leave dressing on your IV site for at least 2 hours and keep area clean and dry  - If you get sick or have symptoms of infection or are not feeling well for any reason.    (Wash your hands often, stay away from people who are sick)  - If you have side effects from your medication that do not go away or are bothersome.     (Refer to the teaching your nurse gave you for side effects to call your doctor about)    - Common side effects may include:  stuffy nose, headache, feeling tired, muscle aches, upset stomach  - Before receiving any vaccines     - Call the [facility] at [phone]  If:  - You get sick, are on antibiotics, have had a recent vaccine, have surgery or dental work and your doctor wants your visit rescheduled.  - You need to cancel and reschedule your visit for any reason. Call at least 2 days before your visit if you need to cancel.   - Your insurance changes before your next visit.    (We will need to get approval from your new insurance. This can take up to two weeks.)     The [facility] is opened Monday thru Friday. We are closed on weekends and holidays.   Voice mail will take your call if the center is closed. If you leave a message please allow 24 hours for a  call back during weekdays. If you leave a message on a weekend/holiday, we will call you back the next business day.

## 2024-02-26 NOTE — PROGRESS NOTES
[facility]   infusion Clinic Note   Date: 2024   Name: Liana Man  : 1935   MRN: [id number]         Reason for Visit: OP Infusion (200 MG VENOFER INFUSION)         Visit Type: INFUSION             Accompanied by:Relative      Diagnosis: Other iron deficiency anemia       Allergies:   Allergies as of 2024 - Reviewed 2024   Allergen Reaction Noted    Oxycodone-acetaminophen Other 2018    Oxycodone Other 01/15/2016    Tramadol Other 01/15/2016         Current Medications has a current medication list which includes the following prescription(s): acetaminophen, albuterol, apixaban, azelastine, bd luer-rae syringe, calcium carbonate-vitamin d3, clobetasol, cyanocobalamin, diltiazem cd, fluticasone, gabapentin, hydralazine, hydroxyzine hcl, losartan, magnesium oxide, omega 3-dha-epa-fish oil, pantoprazole, sennosides-docusate sodium, and syringe with needle, and the following Facility-Administered Medications: iron sucrose (Venofer) 200 mg in sodium chloride 0.9% 100 mL IV.       Vitals:   Vitals:    24 1605   BP: 114/56   Pulse: 79   Resp: 18   Temp: 37.2 °C (99 °F)   SpO2: 95%   PainSc:   7             Infusion Pre-procedure Checklist:   - Allergies reviewed: yes   - Medications reviewed: yes       - Previous reaction to current treatment: no      Assess patient for the concerns below. Document provider notification as appropriate.  - Active or recent infection with/without current antibiotic use: no  - Recent or planned invasive dental work: no  - Recent or planned surgeries: no  - Recently received or plans to receive vaccinations: no  - Has treatment related toxicities: no  - Is pregnant:  no      Pain: 7   - Is the pain different from normal: no   - Is the pain tolerable: yes   - Is your Doctor aware:  yes      Labs: N/A         Fall Risk Screening: Brandie Fall Risk  History of Falling, Immediate or Within 3 Months: No  Secondary Diagnosis:  No  Ambulatory Aid: Crutches/cane/walker  Intravenous Therapy/Heparin Lock: No  Gait/Transferring: Normal/bedrest/immobile  Mental Status: Oriented to own ability  Diego Fall Risk Score: 15       Review Of Systems:  Review of Systems   Constitutional: Negative.    HENT:  Negative.     Eyes: Negative.    Respiratory: Negative.     Cardiovascular: Negative.    Gastrointestinal: Negative.    Endocrine: Negative.    Genitourinary: Negative.     Musculoskeletal: Negative.    Skin: Negative.    Neurological: Negative.    Hematological: Negative.    Psychiatric/Behavioral:          NOT ASSESSED         Infusion Readiness:   - Assessment Concerns Related to Infusion: No  - Provider notified: n/a      Document Below Only If Indicated:   New Patient Education:    N/A (returning patient for continuation of therapy. Ongoing education provided as needed.)        Treatment Conditions & Drug Specific Questions:    NOT APPLICABLE        Weight Based Drug Calculations:    WEIGHT BASED DRUGS: NOT APPLICABLE / FLAT DOSE          Initiated By: Kimberly Cheng RN   Time: 4:25 PM     We administered iron sucrose (Venofer) 200 mg in sodium chloride 0.9% 100 mL IV.

## 2024-02-28 ENCOUNTER — INFUSION (OUTPATIENT)
Dept: INFUSION THERAPY | Facility: CLINIC | Age: 89
End: 2024-02-28
Payer: MEDICARE

## 2024-02-28 VITALS
OXYGEN SATURATION: 96 % | HEART RATE: 70 BPM | SYSTOLIC BLOOD PRESSURE: 166 MMHG | TEMPERATURE: 96.2 F | DIASTOLIC BLOOD PRESSURE: 78 MMHG | RESPIRATION RATE: 16 BRPM

## 2024-02-28 DIAGNOSIS — D50.8 OTHER IRON DEFICIENCY ANEMIA: ICD-10-CM

## 2024-02-28 PROCEDURE — 96365 THER/PROPH/DIAG IV INF INIT: CPT | Performed by: REGISTERED NURSE

## 2024-02-28 RX ORDER — EPINEPHRINE 0.3 MG/.3ML
0.3 INJECTION SUBCUTANEOUS EVERY 5 MIN PRN
Status: CANCELLED | OUTPATIENT
Start: 2024-02-29

## 2024-02-28 RX ORDER — FAMOTIDINE 10 MG/ML
20 INJECTION INTRAVENOUS ONCE AS NEEDED
Status: CANCELLED | OUTPATIENT
Start: 2024-02-29

## 2024-02-28 RX ORDER — DIPHENHYDRAMINE HYDROCHLORIDE 50 MG/ML
50 INJECTION INTRAMUSCULAR; INTRAVENOUS AS NEEDED
Status: CANCELLED | OUTPATIENT
Start: 2024-02-29

## 2024-02-28 RX ORDER — ALBUTEROL SULFATE 0.83 MG/ML
3 SOLUTION RESPIRATORY (INHALATION) AS NEEDED
Status: CANCELLED | OUTPATIENT
Start: 2024-02-29

## 2024-02-28 ASSESSMENT — ENCOUNTER SYMPTOMS
CARDIOVASCULAR NEGATIVE: 1
FATIGUE: 1
BACK PAIN: 1
ARTHRALGIAS: 1
NEUROLOGICAL NEGATIVE: 1
GASTROINTESTINAL NEGATIVE: 1
RESPIRATORY NEGATIVE: 1

## 2024-02-28 ASSESSMENT — PAIN SCALES - GENERAL: PAINLEVEL: 8

## 2024-02-28 NOTE — PROGRESS NOTES
[facility]   Infusion Clinic Note   Date: 2024   Name: Liana Man  : 1935   MRN: [id number]         Reason for Visit: OP Infusion (PATIENT HERE FOR 3/ VENOFER 200 MG INFUSION)      Accompanied by:Child/Children   Visit Type:: Infusion   Diagnosis: Other iron deficiency anemia    Allergies:   Allergies as of 2024 - Reviewed 2024   Allergen Reaction Noted    Oxycodone-acetaminophen Other 2018    Oxycodone Other 01/15/2016    Tramadol Other 01/15/2016      Current Meds has a current medication list which includes the following prescription(s): acetaminophen, albuterol, apixaban, azelastine, bd luer-rae syringe, calcium carbonate-vitamin d3, clobetasol, cyanocobalamin, diltiazem cd, fluticasone, gabapentin, hydralazine, hydroxyzine hcl, losartan, magnesium oxide, omega 3-dha-epa-fish oil, pantoprazole, sennosides-docusate sodium, and syringe with needle, and the following Facility-Administered Medications: iron sucrose (Venofer) 200 mg in sodium chloride 0.9% 100 mL IV.        Vitals:  Vitals:    24 1608   BP: 166/78   Pulse: 70   Resp: 16   Temp: 35.7 °C (96.2 °F)   SpO2: 96%      Infusion Pre-procedure Checklist   Allergies reviewed: yes   Medications reviewed: yes   Contraindications to treatment:No   Previous reaction to current treatment:No   Current Health Issues: None   Pain: 8 [8]' Back [4]   Is the pain different from normal: No   Is the pain tolerable: yes   Is your Doctor aware: yes   Contraindications based on patient history: No   Provider notified: Not applicable   Labs: Labs reviewed   Fall Risk Screening: Diego Fall Risk  History of Falling, Immediate or Within 3 Months: No  Secondary Diagnosis: No  Ambulatory Aid: Crutches/cane/walker  Intravenous Therapy/Heparin Lock: No  Gait/Transferring: Weak  Mental Status: Oriented to own ability  Diego Fall Risk Score: 25    Review of Systems   Constitutional:  Positive for fatigue.    Respiratory: Negative.     Cardiovascular: Negative.    Gastrointestinal: Negative.    Musculoskeletal:  Positive for arthralgias and back pain.   Skin: Negative.    Neurological: Negative.       Negative for complaint: [] all other systems have been reviewed and are negative for complaint   Infusion Readiness:   Assessment Concerns Related to Infusion: No  Provider notified: n/a  Assess patient for the concerns below. Document provider notification as appropriate:  - Does not meet criteria to treat No  - Has an active or recent infection with/without current antibiotic use No  - Has recent/planned dental work No  - Has recent/planned surgeries No  - Has recently received or plans to receive vaccinations No  - Has treatment related toxicities No  - Is pregnant (unless noted otherwise) No    Initiated By: Daly Robert RN   Time: 4:23 PM     We administered iron sucrose (Venofer) 200 mg in sodium chloride 0.9% 100 mL IV.

## 2024-02-28 NOTE — PATIENT INSTRUCTIONS
Today :We administered iron sucrose (Venofer) 200 mg in sodium chloride 0.9% 100 mL IV.     For:   1. Other iron deficiency anemia         Your next appointment is due in:  3/47 @1600        Please read the  Medication Guide that was given to you and reviewed during todays visit.     (Tell all doctors including dentists that you are taking this medication)     Go to the emergency room or call 911 if:  -You have signs of allergic reaction:   -Rash, hives, itching.   -Swollen, blistered, peeling skin.   -Swelling of face, lips, mouth, tongue or throat.   -Tightness of chest, trouble breathing, swallowing or talking     Call your doctor:  - If IV / injection site gets red, warm, swollen, itchy or leaks fluid or pus.     (Leave dressing on your IV site for at least 2 hours and keep area clean and dry  - If you get sick or have symptoms of infection or are not feeling well for any reason.    (Wash your hands often, stay away from people who are sick)  - If you have side effects from your medication that do not go away or are bothersome.     (Refer to the teaching your nurse gave you for side effects to call your doctor about)    - Common side effects may include:  stuffy nose, headache, feeling tired, muscle aches, upset stomach  - Before receiving any vaccines     - Call the [facility] at   If:  - You get sick, are on antibiotics, have had a recent vaccine, have surgery or dental work and your doctor wants your visit rescheduled.  - You need to cancel and reschedule your visit for any reason. Call at least 2 days before your visit if you need to cancel.   - Your insurance changes before your next visit.    (We will need to get approval from your new insurance. This can take up to two weeks.)     The [facility] is opened Monday thru Friday. We are closed on weekends and holidays.   Voice mail will take your call if the center is closed. If you leave a message please allow 24 hours for a  call back during weekdays. If you leave a message on a weekend/holiday, we will call you back the next business day.

## 2024-03-01 ENCOUNTER — APPOINTMENT (OUTPATIENT)
Dept: GASTROENTEROLOGY | Facility: CLINIC | Age: 89
End: 2024-03-01
Payer: MEDICARE

## 2024-03-04 ENCOUNTER — INFUSION (OUTPATIENT)
Dept: INFUSION THERAPY | Facility: CLINIC | Age: 89
End: 2024-03-04
Payer: MEDICARE

## 2024-03-04 VITALS
RESPIRATION RATE: 16 BRPM | TEMPERATURE: 97.4 F | SYSTOLIC BLOOD PRESSURE: 123 MMHG | DIASTOLIC BLOOD PRESSURE: 69 MMHG | OXYGEN SATURATION: 92 % | HEART RATE: 71 BPM

## 2024-03-04 DIAGNOSIS — D50.8 OTHER IRON DEFICIENCY ANEMIA: ICD-10-CM

## 2024-03-04 PROCEDURE — 96365 THER/PROPH/DIAG IV INF INIT: CPT | Performed by: REGISTERED NURSE

## 2024-03-04 RX ORDER — ALBUTEROL SULFATE 0.83 MG/ML
3 SOLUTION RESPIRATORY (INHALATION) AS NEEDED
Status: CANCELLED | OUTPATIENT
Start: 2024-03-05

## 2024-03-04 RX ORDER — EPINEPHRINE 0.3 MG/.3ML
0.3 INJECTION SUBCUTANEOUS EVERY 5 MIN PRN
Status: CANCELLED | OUTPATIENT
Start: 2024-03-05

## 2024-03-04 RX ORDER — DIPHENHYDRAMINE HYDROCHLORIDE 50 MG/ML
50 INJECTION INTRAMUSCULAR; INTRAVENOUS AS NEEDED
Status: CANCELLED | OUTPATIENT
Start: 2024-03-05

## 2024-03-04 RX ORDER — FAMOTIDINE 10 MG/ML
20 INJECTION INTRAVENOUS ONCE AS NEEDED
Status: CANCELLED | OUTPATIENT
Start: 2024-03-05

## 2024-03-04 NOTE — PROGRESS NOTES
[facility]   Infusion Clinic Note   Date: 2024   Name: Liana Man  : 1935   MRN: [id number]         Reason for Visit: OP Infusion (PATIENT HERE FOR DAY  VENOFER 200 MG INFUSION)      Accompanied by:Child/Children   Visit Type:: Infusion   Diagnosis: Other iron deficiency anemia    Allergies:   Allergies as of 2024 - Reviewed 2024   Allergen Reaction Noted   • Oxycodone-acetaminophen Other 2018   • Oxycodone Other 01/15/2016   • Tramadol Other 01/15/2016      Current Meds has a current medication list which includes the following prescription(s): acetaminophen, albuterol, apixaban, azelastine, bd luer-rae syringe, calcium carbonate-vitamin d3, clobetasol, cyanocobalamin, diltiazem cd, fluticasone, gabapentin, hydralazine, hydroxyzine hcl, losartan, magnesium oxide, omega 3-dha-epa-fish oil, pantoprazole, sennosides-docusate sodium, and syringe with needle, and the following Facility-Administered Medications: iron sucrose (Venofer) 200 mg in sodium chloride 0.9% 100 mL IV.        Vitals:  Vitals:    24 1609   BP: 123/69   Pulse: 71   Resp: 16   Temp: 36.3 °C (97.4 °F)   SpO2: 92%      Infusion Pre-procedure Checklist   Allergies reviewed: yes   Medications reviewed: yes   Contraindications to treatment:No   Previous reaction to current treatment:No   Current Health Issues: None   Pain: ' 8 BACK   Is the pain different from normal: Yes   Is the pain tolerable: yes   Is your Doctor aware: yes   Contraindications based on patient history: No   Provider notified: Not applicable   Labs: Labs reviewed   Fall Risk Screening: Brandie Fall Risk  History of Falling, Immediate or Within 3 Months: No  Secondary Diagnosis: No  Ambulatory Aid: Crutches/cane/walker  Intravenous Therapy/Heparin Lock: No  Gait/Transferring: Impaired   Mental Status: Oriented to own ability  Diego Fall Risk Score: 35    Review of Systems - Oncology   Negative for complaint: [x]  all other systems have been reviewed and are negative for complaint   Infusion Readiness:   Assessment Concerns Related to Infusion: No  Provider notified: n/a  Assess patient for the concerns below. Document provider notification as appropriate:  - Does not meet criteria to treat No  - Has an active or recent infection with/without current antibiotic use No  - Has recent/planned dental work No  - Has recent/planned surgeries No  - Has recently received or plans to receive vaccinations No  - Has treatment related toxicities No  - Is pregnant (unless noted otherwise) N/A    Initiated By: Daly Robert RN   Time: 4:21 PM     We administered iron sucrose (Venofer) 200 mg in sodium chloride 0.9% 100 mL IV.

## 2024-03-04 NOTE — PATIENT INSTRUCTIONS
Today :We administered iron sucrose (Venofer) 200 mg in sodium chloride 0.9% 100 mL IV.     For:   1. Other iron deficiency anemia         Your next appointment is due in:  4/6 4 PM      Please read the  Medication Guide that was given to you and reviewed during todays visit.     (Tell all doctors including dentists that you are taking this medication)     Go to the emergency room or call 911 if:  -You have signs of allergic reaction:   -Rash, hives, itching.   -Swollen, blistered, peeling skin.   -Swelling of face, lips, mouth, tongue or throat.   -Tightness of chest, trouble breathing, swallowing or talking     Call your doctor:  - If IV / injection site gets red, warm, swollen, itchy or leaks fluid or pus.     (Leave dressing on your IV site for at least 2 hours and keep area clean and dry  - If you get sick or have symptoms of infection or are not feeling well for any reason.    (Wash your hands often, stay away from people who are sick)  - If you have side effects from your medication that do not go away or are bothersome.     (Refer to the teaching your nurse gave you for side effects to call your doctor about)    - Common side effects may include:  stuffy nose, headache, feeling tired, muscle aches, upset stomach  - Before receiving any vaccines     - [facility] at   If:  - You get sick, are on antibiotics, have had a recent vaccine, have surgery or dental work and your doctor wants your visit rescheduled.  - You need to cancel and reschedule your visit for any reason. Call at least 2 days before your visit if you need to cancel.   - Your insurance changes before your next visit.    (We will need to get approval from your new insurance. This can take up to two weeks.)     The [facility] is opened Monday thru Friday. We are closed on weekends and holidays.   Voice mail will take your call if the center is closed. If you leave a message please allow 24 hours for a call  back during weekdays. If you leave a message on a weekend/holiday, we will call you back the next business day.

## 2024-03-06 ENCOUNTER — INFUSION (OUTPATIENT)
Dept: INFUSION THERAPY | Facility: CLINIC | Age: 89
End: 2024-03-06
Payer: MEDICARE

## 2024-03-06 ENCOUNTER — APPOINTMENT (OUTPATIENT)
Dept: OTOLARYNGOLOGY | Facility: CLINIC | Age: 89
End: 2024-03-06
Payer: MEDICARE

## 2024-03-06 VITALS
TEMPERATURE: 98.2 F | HEART RATE: 75 BPM | OXYGEN SATURATION: 94 % | SYSTOLIC BLOOD PRESSURE: 164 MMHG | RESPIRATION RATE: 18 BRPM | DIASTOLIC BLOOD PRESSURE: 72 MMHG

## 2024-03-06 DIAGNOSIS — D50.8 OTHER IRON DEFICIENCY ANEMIA: ICD-10-CM

## 2024-03-06 PROCEDURE — 96365 THER/PROPH/DIAG IV INF INIT: CPT | Performed by: NURSE PRACTITIONER

## 2024-03-06 RX ORDER — DIPHENHYDRAMINE HYDROCHLORIDE 50 MG/ML
50 INJECTION INTRAMUSCULAR; INTRAVENOUS AS NEEDED
Status: CANCELLED | OUTPATIENT
Start: 2024-03-07

## 2024-03-06 RX ORDER — FAMOTIDINE 10 MG/ML
20 INJECTION INTRAVENOUS ONCE AS NEEDED
Status: CANCELLED | OUTPATIENT
Start: 2024-03-07

## 2024-03-06 RX ORDER — EPINEPHRINE 0.3 MG/.3ML
0.3 INJECTION SUBCUTANEOUS EVERY 5 MIN PRN
Status: CANCELLED | OUTPATIENT
Start: 2024-03-07

## 2024-03-06 RX ORDER — ALBUTEROL SULFATE 0.83 MG/ML
3 SOLUTION RESPIRATORY (INHALATION) AS NEEDED
Status: CANCELLED | OUTPATIENT
Start: 2024-03-07

## 2024-03-06 ASSESSMENT — ENCOUNTER SYMPTOMS
EYES NEGATIVE: 1
CARDIOVASCULAR NEGATIVE: 1
CONSTITUTIONAL NEGATIVE: 1
NEUROLOGICAL NEGATIVE: 1
MUSCULOSKELETAL NEGATIVE: 1
GASTROINTESTINAL NEGATIVE: 1
ENDOCRINE NEGATIVE: 1
HEMATOLOGIC/LYMPHATIC NEGATIVE: 1

## 2024-03-06 ASSESSMENT — PAIN SCALES - GENERAL: PAINLEVEL: 0-NO PAIN

## 2024-03-06 NOTE — PROGRESS NOTES
[facility]   Infusion Clinic Note   Date: 2024   Name: Liana Man  : 1935   MRN: [id number]         Reason for Visit: OP Infusion (200 mg  venofer infusion)         Visit Type: INFUSION             Accompanied by:Child/Children      Diagnosis: Other iron deficiency anemia       Allergies:   Allergies as of 2024 - Reviewed 2024   Allergen Reaction Noted    Oxycodone-acetaminophen Other 2018    Oxycodone Other 01/15/2016    Tramadol Other 01/15/2016         Current Medications has a current medication list which includes the following prescription(s): acetaminophen, albuterol, apixaban, azelastine, bd luer-rae syringe, calcium carbonate-vitamin d3, clobetasol, cyanocobalamin, diltiazem cd, fluticasone, gabapentin, hydralazine, hydroxyzine hcl, losartan, magnesium oxide, omega 3-dha-epa-fish oil, pantoprazole, sennosides-docusate sodium, and syringe with needle.       Vitals:   Vitals:    24 1650   BP: 164/72   Pulse: 75   Resp: 18   Temp: 36.8 °C (98.2 °F)   SpO2: 94%   PainSc: 0-No pain             Infusion Pre-procedure Checklist:   - Allergies reviewed: yes   - Medications reviewed: yes       - Previous reaction to current treatment: no      Assess patient for the concerns below. Document provider notification as appropriate.  - Active or recent infection with/without current antibiotic use: no  - Recent or planned invasive dental work: no  - Recent or planned surgeries: no  - Recently received or plans to receive vaccinations: no  - Has treatment related toxicities: no  - Is pregnant:  no      Pain: 0   - Is the pain different from normal: n/a   - Is the pain tolerable: n/a   - Is your Doctor aware:  n/a      Labs: N/A         Fall Risk Screening: Brandie Fall Risk  History of Falling, Immediate or Within 3 Months: No  Secondary Diagnosis: No  Ambulatory Aid: Crutches/cane/walker  Intravenous Therapy/Heparin Lock: No  Gait/Transferring:  Weak  Mental Status: Oriented to own ability  Diego Fall Risk Score: 25       Review Of Systems:  Review of Systems   Constitutional: Negative.    HENT:  Negative.     Eyes: Negative.    Respiratory:          Sinus issues   Cardiovascular: Negative.    Gastrointestinal: Negative.    Endocrine: Negative.    Genitourinary: Negative.     Musculoskeletal: Negative.    Skin: Negative.    Neurological: Negative.    Hematological: Negative.    Psychiatric/Behavioral:          Not assessed         Infusion Readiness:   - Assessment Concerns Related to Infusion: No  - Provider notified: n/a      Document Below Only If Indicated:   New Patient Education:    N/A (returning patient for continuation of therapy. Ongoing education provided as needed.)        Treatment Conditions & Drug Specific Questions:    NOT APPLICABLE        Weight Based Drug Calculations:    WEIGHT BASED DRUGS: NOT APPLICABLE / FLAT DOSE          Initiated By: Kimberly Cheng RN   Time: 5:42 PM     We administered iron sucrose (Venofer) 200 mg in sodium chloride 0.9% 100 mL IV.

## 2024-03-06 NOTE — PATIENT INSTRUCTIONS
Today :We administered iron sucrose (Venofer) 200 mg in sodium chloride 0.9% 100 mL IV.     For:   1. Other iron deficiency anemia         Your next appointment is due in: Infusions complete       Please read the  Medication Guide that was given to you and reviewed during todays visit.     (Tell all doctors including dentists that you are taking this medication)     Go to the emergency room or call 911 if:  -You have signs of allergic reaction:   -Rash, hives, itching.   -Swollen, blistered, peeling skin.   -Swelling of face, lips, mouth, tongue or throat.   -Tightness of chest, trouble breathing, swallowing or talking     Call your doctor:  - If IV / injection site gets red, warm, swollen, itchy or leaks fluid or pus.     (Leave dressing on your IV site for at least 2 hours and keep area clean and dry  - If you get sick or have symptoms of infection or are not feeling well for any reason.    (Wash your hands often, stay away from people who are sick)  - If you have side effects from your medication that do not go away or are bothersome.     (Refer to the teaching your nurse gave you for side effects to call your doctor about)    - Common side effects may include:  stuffy nose, headache, feeling tired, muscle aches, upset stomach  - Before receiving any vaccines     - Call the [facility] at [phone]  If:  - You get sick, are on antibiotics, have had a recent vaccine, have surgery or dental work and your doctor wants your visit rescheduled.  - You need to cancel and reschedule your visit for any reason. Call at least 2 days before your visit if you need to cancel.   - Your insurance changes before your next visit.    (We will need to get approval from your new insurance. This can take up to two weeks.)     [facility] is opened Monday thru Friday. We are closed on weekends and holidays.   Voice mail will take your call if the center is closed. If you leave a message please  allow 24 hours for a call back during weekdays. If you leave a message on a weekend/holiday, we will call you back the next business day.

## 2024-03-07 ENCOUNTER — OFFICE VISIT (OUTPATIENT)
Dept: OTOLARYNGOLOGY | Facility: CLINIC | Age: 89
End: 2024-03-07
Payer: MEDICARE

## 2024-03-07 VITALS
SYSTOLIC BLOOD PRESSURE: 154 MMHG | WEIGHT: 143 LBS | DIASTOLIC BLOOD PRESSURE: 79 MMHG | TEMPERATURE: 97.3 F | BODY MASS INDEX: 26.31 KG/M2 | HEIGHT: 62 IN

## 2024-03-07 DIAGNOSIS — J34.89 NASAL MUCOSA DRY: Primary | ICD-10-CM

## 2024-03-07 DIAGNOSIS — R09.81 CHRONIC NASAL CONGESTION: ICD-10-CM

## 2024-03-07 PROCEDURE — 1036F TOBACCO NON-USER: CPT | Performed by: OTOLARYNGOLOGY

## 2024-03-07 PROCEDURE — 1159F MED LIST DOCD IN RCRD: CPT | Performed by: OTOLARYNGOLOGY

## 2024-03-07 PROCEDURE — 1123F ACP DISCUSS/DSCN MKR DOCD: CPT | Performed by: OTOLARYNGOLOGY

## 2024-03-07 PROCEDURE — 1126F AMNT PAIN NOTED NONE PRSNT: CPT | Performed by: OTOLARYNGOLOGY

## 2024-03-07 PROCEDURE — 1160F RVW MEDS BY RX/DR IN RCRD: CPT | Performed by: OTOLARYNGOLOGY

## 2024-03-07 PROCEDURE — 3078F DIAST BP <80 MM HG: CPT | Performed by: OTOLARYNGOLOGY

## 2024-03-07 PROCEDURE — 1157F ADVNC CARE PLAN IN RCRD: CPT | Performed by: OTOLARYNGOLOGY

## 2024-03-07 PROCEDURE — 99203 OFFICE O/P NEW LOW 30 MIN: CPT | Performed by: OTOLARYNGOLOGY

## 2024-03-07 PROCEDURE — 3077F SYST BP >= 140 MM HG: CPT | Performed by: OTOLARYNGOLOGY

## 2024-03-07 ASSESSMENT — PATIENT HEALTH QUESTIONNAIRE - PHQ9
2. FEELING DOWN, DEPRESSED OR HOPELESS: NOT AT ALL
SUM OF ALL RESPONSES TO PHQ9 QUESTIONS 1 AND 2: 0
1. LITTLE INTEREST OR PLEASURE IN DOING THINGS: NOT AT ALL

## 2024-03-07 NOTE — PROGRESS NOTES
Impression:  1. Nasal mucosa dry        2. Chronic nasal congestion  Referral to ENT           RECOMMENDATIONS/PLAN :  I reassured the patient there is no evidence of any infection or intranasal polyps however her nose is extremely dry and she has dried mucus that is connecting her inferior turbinates to her septum.  I was able to remove some of this mucus.  I want her to start flushing her nose using saline rinses frequently and she will continue her Flonase nasal spray as directed.      **This electronic medical record note was created with the use of voice recognition software.  Despite proofreading, typographical or grammatical errors may be present that could affect meaning of content **    Subjective   Patient ID:     Liana Man is a 88 y.o. female who presents to the office today complaining of persistent nasal congestion.  She was using Flonase however she is still having trouble getting airflow through her nose.  She denies any history of chronic sinus infections fever or chills.  Currently she is not using any saline in the nose.  She denies any active bleeding.    ROS:  A detailed 12 system review of systems is noted on the intake form has been reviewed with the patient with details noted in the HPI and scanned into the patient's medical record.    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    Other conditions influencing health status     Breast Cancer    Pain in unspecified hip 12/17/2015    Joint pain, hip    Pain in unspecified hip 06/15/2015    Hip pain     "Personal history of malignant neoplasm of breast 12/17/2015    History of malignant neoplasm of breast    Personal history of other specified conditions     History of lymphadenopathy        Past Surgical History:   Procedure Laterality Date    BREAST LUMPECTOMY  08/22/2013    Right Breast Lumpectomy    CARPAL TUNNEL RELEASE  08/22/2013    Neuroplasty Decompression Median Nerve At Carpal Tunnel    NASAL SEPTUM SURGERY  08/22/2013    Nasal Septal Deviation Repair    OTHER SURGICAL HISTORY  08/22/2013    Hysteroscopy    OTHER SURGICAL HISTORY  09/10/2019    Shoulder replacement    OTHER SURGICAL HISTORY  09/10/2019    Shoulder surgery    OTHER SURGICAL HISTORY  12/17/2015    Reported Hx Of Hip Replacement - Left Side        Allergies   Allergen Reactions    Oxycodone-Acetaminophen Other    Oxycodone Other     \"Makes me loopy\"    Tramadol Other     \"spacy\"          Current Outpatient Medications:     apixaban (Eliquis) 5 mg tablet, Take 1 tablet (5 mg) by mouth twice a day., Disp: , Rfl:     azelastine (Astelin) 137 mcg (0.1 %) nasal spray, Administer 1 spray into each nostril 2 times a day. Use in each nostril as directed, Disp: 90 mL, Rfl: 3    calcium carbonate-vitamin D3 500 mg-5 mcg (200 unit) tablet, Take 1 tablet by mouth once daily., Disp: , Rfl:     clobetasol (Temovate) 0.05 % external solution, Apply topically 2 times a day., Disp: 150 mL, Rfl: 3    cyanocobalamin (Vitamin B-12) 1,000 mcg/mL injection, Inject 1 mL (1,000 mcg) into the shoulder, thigh, or buttocks every 30 (thirty) days., Disp: 10 mL, Rfl: 0    dilTIAZem CD (Cardizem CD) 120 mg 24 hr capsule, Take 1 capsule (120 mg) by mouth every 12 hours., Disp: , Rfl:     fluticasone (Flonase) 50 mcg/actuation nasal spray, Administer 2 sprays into each nostril once daily., Disp: 16 g, Rfl: 3    gabapentin (Neurontin) 100 mg capsule, Take 1 pill at bedtime. May titrate up to 3 pills at bedtime if tolerating, Disp: 90 capsule, Rfl: 0    hydrALAZINE " "(Apresoline) 25 mg tablet, Take 1 tablet (25 mg) by mouth once daily at bedtime., Disp: , Rfl:     hydrOXYzine HCL (Atarax) 25 mg tablet, Take 1 tablet (25 mg) by mouth every 8 hours if needed., Disp: , Rfl:     losartan (Cozaar) 50 mg tablet, Take 1 tablet (50 mg) by mouth once daily. as directed, Disp: 90 tablet, Rfl: 3    magnesium oxide (Mag-Ox) 400 mg (241.3 mg magnesium) tablet, Take 1 tablet (400 mg) by mouth once daily., Disp: , Rfl:     omega 3-dha-epa-fish oil 300-1,000 mg capsule,delayed release(DR/EC), Take by mouth twice a day., Disp: , Rfl:     pantoprazole (ProtoNix) 40 mg EC tablet, Take 1 tablet (40 mg) by mouth once daily., Disp: 90 tablet, Rfl: 3    sennosides-docusate sodium (Myah-Colace) 8.6-50 mg tablet, Take 2 tablets by mouth once daily as needed., Disp: , Rfl:     syringe with needle 3 mL 25 gauge x 1\" syringe, 1 each every 30 (thirty) days., Disp: 10 each, Rfl: 0    acetaminophen (Tylenol) 500 mg tablet, Take 1 tablet (500 mg) by mouth., Disp: , Rfl:     albuterol 90 mcg/actuation inhaler, Inhale 2 puffs every 4 hours if needed., Disp: , Rfl:     BD Luer-Dae Syringe 3 mL 23 x 1\" syringe, AS DIRECTED, Disp: , Rfl:   No current facility-administered medications for this visit.     Tobacco Use: Medium Risk (3/7/2024)    Patient History     Smoking Tobacco Use: Former     Smokeless Tobacco Use: Never     Passive Exposure: Not on file        Alcohol Use: Not on file        Social History     Substance and Sexual Activity   Drug Use Never        Physical Exam:  Visit Vitals  /79   Temp 36.3 °C (97.3 °F) (Temporal)   Ht 1.575 m (5' 2\")   Wt 64.9 kg (143 lb)   BMI 26.16 kg/m²   OB Status Postmenopausal   Smoking Status Former   BSA 1.69 m²      General: Patient is alert, oriented, cooperative in no apparent distress.  Head: Normocephalic, atraumatic.  Eyes: PERRL, EOMI, Conjunctiva is clear. No nystagmus.  Ears: Right Ear-- Pinna is normal.  External auditory canal is patent. Tympanic " membrane is [intact, translucent and has good mobility with my pneumatic otoscope. No effusion].  Mastoid is nontender.  Left ear-- Pinna is normal.  External auditory canal is patent. Tympanic membrane is [intact, translucent and has good mobility with my pneumatic otoscope.  No effusion].  Mastoid is nontender.  Nose: Septum is relatively straight.  No septal perforation or lesions. No septal hematoma/ seroma.  No signs of bleeding.  Inferior turbinates are mildly swollen.   No evidence of intranasal polyps.  No infectious drainage.  The patient does have dried mucus that is blocking her nasal airway.  I was able to remove some of this using a Cortera forcep.  Mucosa is extremely dry  Throat:  Floor of mouth is clear, no masses.  Tongue appears normal, no lesions or masses. Gums, gingiva, buccal mucosa appear pink and moist, no lesions.   Peritonsillar regions appear symmetric without swelling.  Hard and soft palate appear normal, no obvious cleft. Uvula is midline.  Oropharynx: No lesions. Retropharyngeal wall is flat.  No active postnasal drip.  Neck: Supple,  no lymphadenopathy.  No masses.  Salivary Glands: Symmetric bilaterally.  No palpable masses.  No evidence of acute infection or salivary stones  Neurologic: Cranial Nerves 2-12 are grossly intact without focal deficits. Cerebellar function testing is normal.     Results:   []    Procedure:   []    Soy Stevens, DO

## 2024-03-08 ENCOUNTER — APPOINTMENT (OUTPATIENT)
Dept: INFUSION THERAPY | Facility: CLINIC | Age: 89
End: 2024-03-08
Payer: MEDICARE

## 2024-03-12 ENCOUNTER — APPOINTMENT (OUTPATIENT)
Dept: PAIN MEDICINE | Facility: CLINIC | Age: 89
End: 2024-03-12
Payer: MEDICARE

## 2024-03-12 ENCOUNTER — TELEMEDICINE (OUTPATIENT)
Dept: PAIN MEDICINE | Facility: CLINIC | Age: 89
End: 2024-03-12
Payer: MEDICARE

## 2024-03-12 DIAGNOSIS — M54.50 CHRONIC LOW BACK PAIN, UNSPECIFIED BACK PAIN LATERALITY, UNSPECIFIED WHETHER SCIATICA PRESENT: ICD-10-CM

## 2024-03-12 DIAGNOSIS — G89.29 CHRONIC LOW BACK PAIN, UNSPECIFIED BACK PAIN LATERALITY, UNSPECIFIED WHETHER SCIATICA PRESENT: ICD-10-CM

## 2024-03-12 PROCEDURE — 1126F AMNT PAIN NOTED NONE PRSNT: CPT | Performed by: PAIN MEDICINE

## 2024-03-12 PROCEDURE — 99213 OFFICE O/P EST LOW 20 MIN: CPT | Performed by: PAIN MEDICINE

## 2024-03-12 PROCEDURE — 1157F ADVNC CARE PLAN IN RCRD: CPT | Performed by: PAIN MEDICINE

## 2024-03-12 PROCEDURE — 1160F RVW MEDS BY RX/DR IN RCRD: CPT | Performed by: PAIN MEDICINE

## 2024-03-12 PROCEDURE — 1036F TOBACCO NON-USER: CPT | Performed by: PAIN MEDICINE

## 2024-03-12 PROCEDURE — 1159F MED LIST DOCD IN RCRD: CPT | Performed by: PAIN MEDICINE

## 2024-03-12 PROCEDURE — 1123F ACP DISCUSS/DSCN MKR DOCD: CPT | Performed by: PAIN MEDICINE

## 2024-03-12 NOTE — PROGRESS NOTES
"3/12/2024    Virtual interview   Ms. Man had virtual interview today. She has been complaining of both back and leg weakness and pain. She had epidural steroid injection few weeks ago. She reported 100% relief for few days and pain on her back came back to base line. She also mentioned, she had many back injection over the years with other pain management team. Her MRI lumbar spine shows multiple level foraminal stenosis and facet arthropathy.     Plan  Schedule for lumbar facet MBB  L3/4 L4/5 L5/S1  Fluoro guidance   Goal to have more than 80% pain improvement     Previous Visit     Encounter Date: 1/16/2024     Signed       Expand All Collapse All    Pain Management Clinic Note      Chief Complaint: back pain and right leg weakness   Referred by: orthopedic surgery      History Of Present Illness  Liana Man is a 88 y.o. female with a past medical history of chronic back pain s/p kyphoplasty (T12 and L1) and interspinous spacer (L5-S1) done in [place], [facility] on eliquis who presents as a new patient evaluation for chronic low back pain.      Her main complaint is axial right-sided low back pain which she states sometimes radiates into her right shoulder.  She previously had right leg radicular symptoms, but has since been going to chiropractor which has resolved right leg symptoms. She has a longstanding history of chronic low back pain and has received multiple injections as well as T12 and L1 kyphoplasties and L5-S1 interspinous spacer all done in [place].  She goes to [place] 6 months out of the year but is from the [facility] area.  She is mainly bothered by not being able to stand for any length of time because of her back pain.  She requires a rollator to ambulate, and when without assistive device she has to stoop forward.  She denies any leg symptoms but complains of bilateral lower extremity \"weakness\".  She has seen an orthopedic surgeon, Dr. Phillips, who recommends conservative " management.  She has previously done PT in the past,  but not recently. She is not engaging in home exercises at this moment, but is willing to start doing exercise again. She has received multiple epidurals in the past with good relief, but states they slowly lost efficacy overtime.      Most recent imaging MRI L-spine shows moderate central stenosis at L4-5.      Previous treatments : multiple spine injections in the past, kyphoplasty (T12, L1), interspinous spacer L4-5.     The pain causes significant stress in the patient's life, specifically interferes with general activity, mood, walking ability, ability to perform tasks at home and/or work. Denies any bowel or bladder incontinence, saddle anesthesia, worsening pain, weakness or falls.     Past Medical History  She has a past medical history of Breast cancer (CMS/[facility]), Other abnormal and inconclusive findings on diagnostic imaging of breast, Other conditions influencing health status, Other conditions influencing health status, Other conditions influencing health status, Pain in unspecified hip (12/17/2015), Pain in unspecified hip (06/15/2015), Personal history of malignant neoplasm of breast (12/17/2015), and Personal history of other specified conditions.     Surgical History  She has a past surgical history that includes Breast lumpectomy (08/22/2013); Nasal septum surgery (08/22/2013); Carpal tunnel release (08/22/2013); Other surgical history (08/22/2013); Other surgical history (09/10/2019); Other surgical history (09/10/2019); and Other surgical history (12/17/2015).     Social History  She reports that she has quit smoking. Her smoking use included cigarettes. She has never used smokeless tobacco. She reports current alcohol use. She reports that she does not use drugs.     Family History  Family History          Family History   Problem Relation Name Age of Onset    Breast cancer Sister                Allergies  Oxycodone-acetaminophen, Oxycodone,  and Tramadol     Review of Symptoms:   Constitutional: Negative for chills, diaphoresis or fever  HENT: Negative for neck swelling  Eyes:.  Negative for eye pain  Respiratory:.  Negative for cough, shortness of breath or wheezing    Cardiovascular:.  Negative for chest pain or palpitations  Gastrointestinal:.  Negative for abdominal pain, nausea and vomiting  Genitourinary:.  Negative for urgency  Musculoskeletal:  Positive for back pain. Positive for joint pain. Denies falls within the past 3 months.  Skin: Negative for wounds or itching   Neurological: Negative for dizziness, seizures, loss of consciousness and weakness  Endo/Heme/Allergies: Does not bruise/bleed easily  Psychiatric/Behavioral: Negative for depression. The patient does not appear anxious.       PHYSICAL EXAM  Vitals signs reviewed  Constitutional:       General: Not in acute distress     Appearance: Normal appearance. Not ill-appearing.  HENT:     Head: Normocephalic and atraumatic  Eyes:     Conjunctiva/sclera: Conjunctivae normal  Cardiovascular:     Rate and Rhythm: Normal rate and regular rhythm  Pulmonary:     Effort: No respiratory distress  Abdominal:     Palpations: Abdomen is soft  Musculoskeletal: POWELL  Skin:     General: Skin is warm and dry  Neurological:     General: No focal deficit present  Psychiatric:         Mood and Affect: Mood normal         Behavior: Behavior normal     Advanced Exam   Inspection: No gross deformities, no surgical scars  Palpation: + tenderness of patient of lumbar midline, +ttp of right lumbar paraspinals, no ttp to bilateral SI joints  ROM: Normal range of motion of the lumbar flexion extension  Motor: 5/5 strength upper and lower extremities  Sensory: Negative for sensory abnormalities in upper and lower extremities  Reflexes: 2+ reflexes bilateral upper and lower extremities, negative clonus, negative kaye's   Lumbar: Negative straight leg raising bilaterally  Sacral: Negative Seamus     Last Recorded  "Vitals  There were no vitals taken for this visit.     Relevant Results       Current Outpatient Medications   Medication Instructions    acetaminophen (TYLENOL) 500 mg, oral    albuterol 90 mcg/actuation inhaler 2 puffs, inhalation, Every 4 hours PRN    apixaban (ELIQUIS) 5 mg, oral, 2 times daily    calcium carbonate-vitamin D3 500 mg-5 mcg (200 unit) tablet 1 tablet, oral, Daily RT    clobetasol (Temovate) 0.05 % external solution Topical, 2 times daily    cyanocobalamin (VITAMIN B-12) 1,000 mcg, intramuscular, Every 30 days    dilTIAZem CD (CARDIZEM CD) 120 mg, oral, Every 12 hours    ferrous sulfate (325 mg ferrous sulfate) 325 mg, oral, Daily with breakfast    fluticasone (Flonase) 50 mcg/actuation nasal spray 2 sprays, Each Nostril, Daily    hydrALAZINE (APRESOLINE) 25 mg, oral, Nightly    hydrOXYzine HCL (ATARAX) 25 mg, oral, Every 8 hours PRN    losartan (COZAAR) 50 mg, oral, Daily, as directed    magnesium oxide (Mag-Ox) 400 mg (241.3 mg magnesium) tablet 1 tablet, oral, Daily    omega 3-dha-epa-fish oil 300-1,000 mg capsule,delayed release(DR/EC) oral, 2 times daily    pantoprazole (PROTONIX) 40 mg, oral, Daily    sennosides-docusate sodium (Myah-Colace) 8.6-50 mg tablet 2 tablets, oral, Daily PRN    syringe with needle 3 mL 25 gauge x 1\" syringe 1 each, miscellaneous, Every 30 days         MR lumbar spine wo IV contrast 05/06/2022     Narrative  Report generated by voice recognition software by Marcelo Guzman DO on 5/6/2022 5:25 PM.     EXAMINATION:  MRI SPINE LUMBAR W/O CONTRAST     EXAM DATE:  5/6/2022 5:17 PM     CLINICAL HISTORY:  radiculopathy lumbar region  radiculopathy, lumbar region  radiculopathy, lumbar region  Patient Reported Symptom: no  Diagnosis Info: Radiculopathy of lumbar region     ADDITIONAL CLINICAL HISTORY:  None.     COMPARISON:  No comparisons were made when reading this study.     TECHNIQUE:  Multiplanar MR pulse sequences were performed through the lumbar spine without the " use of intravenous contrast.     FINDINGS:  Mild curvature thoracolumbar spine. T12 and L1 vertebroplasty is noted. Chronic compression deformity T11 and T12. The conus is unremarkable. X-Stop device L4-L5 spinous process. Endplate bone marrow edema at L1-L2.     Segmental Analysis:     L1-L2:  5 mm retrolisthesis at L1. Advanced degenerative endplate changes seen. Facet arthropathy and ligamentum flavum hypertrophy is noted. Diffuse disc bulge, findings causing mild to moderate spinal canal stenosis. There is moderate foraminal stenosis on the right with moderate to severe on the left. No focal disc herniation seen.     L2-L3:  Mild disc bulge is noted. Facet arthropathy with ligamentum flavum hypertrophy, findings causing mild spinal canal stenosis. Mild to moderate foraminal stenosis bilaterally. No focal disc herniation is seen.     L3-L4:  Facet arthropathy with ligamentum flavum hypertrophy causing mild to moderate spinal canal stenosis. Mild foraminal narrowing bilaterally. No disc herniation or significant spinal canal or foraminal stenosis.     L4-L5:  Facet arthropathy with ligamentum flavum hypertrophy. Diffuse disc bulge with disc-osteophyte the right foramen causing moderate to severe foraminal stenosis on the right. Moderate spinal canal stenosis with mild to moderate left foraminal stenosis. No focal disc herniation seen.     L5-S1:  Diffuse disc bulge with disc-osteophyte at the foramen bilaterally. Facet arthropathy with moderate foraminal stenosis on the right and mild to moderate on the left. No significant spinal canal stenosis. No focal disc herniation seen.     Impression  1. Multilevel degenerative changes and disc disease causing spinal canal and foraminal stenosis detailed above.     No image results found.        1. Low back pain, unspecified back pain laterality, unspecified chronicity, unspecified whether sciatica present  Referral to Pain Medicine       2. Lumbar degenerative disc  disease  Referral to Pain Medicine             ASSESSMENT/PLAN  Liana Man is a 88 y.o. female with a past medical history of chronic back pain s/p kyphoplasty (T12 and L1) and interspinous spacer (L5-S1) done in [place] and [facility] on eliquis who presents for evaluation of chronic axial, right-sided low back pain.  The patient previously had right leg radicular symptoms, but it has since resolved since doing chiropractic treatment.  Her back pain is severely limiting her ability to walk, essentially she is not able to go any length of time without needing to lean forward on her rollator.  She has received multiple spine injections in the past in [place] with good relief and is amenable to a repeat epidural steroid injection.  She has been able to hold her Eliquis in the past for procedures, but she will confirm with her cardiologist that she can hold her Eliquis 5 days prior to an injection.  MRI reveals lumbar spinal stenosis at L4-5 due to disc bulge and ligamentum flavum hypertrophy as well as severe right L4-5 neural foraminal narrowing. She has completed PT in the past and has tried Tramadol and Tylenol for her pain.         Based on history, available imaging and physical exam, the patient's primary pain etiology is likely due to lumbar spinal stenosis and lumbar radiculitis.         Our plan is as follows:  -Once patient obtains clearance from cardiologist to hold Eliquis we will schedule her for a L4-5 right biased interlaminar lumbar epidural steroid injection.  - We will refer the patient physical therapy: A referral for physical therapy was provided to the patient. We discussed initiating physical therapy to help manage the patient's painful condition. The patient was counseled that muscle strengthening will improve the long term prognosis in regards to pain and may also help increase range of motion and mobility. The patient's questions were answered and he was agreeable to this course.   -  Continue pain medications as prescribed. Consider trial of gabapentin nightly if no improvement.      The patient was invited to contact us back anytime with any questions or concerns and follow-up with us in the office as needed.

## 2024-03-13 ENCOUNTER — APPOINTMENT (OUTPATIENT)
Dept: GASTROENTEROLOGY | Facility: CLINIC | Age: 89
End: 2024-03-13
Payer: MEDICARE

## 2024-03-15 ENCOUNTER — APPOINTMENT (OUTPATIENT)
Dept: INFUSION THERAPY | Facility: CLINIC | Age: 89
End: 2024-03-15
Payer: MEDICARE

## 2024-03-18 ENCOUNTER — LAB (OUTPATIENT)
Dept: LAB | Facility: LAB | Age: 89
End: 2024-03-18
Payer: MEDICARE

## 2024-03-18 DIAGNOSIS — D50.8 OTHER IRON DEFICIENCY ANEMIA: ICD-10-CM

## 2024-03-18 LAB
ERYTHROCYTE [DISTWIDTH] IN BLOOD BY AUTOMATED COUNT: 18.8 % (ref 11.5–14.5)
FERRITIN SERPL-MCNC: 242 NG/ML (ref 8–150)
HCT VFR BLD AUTO: 37.1 % (ref 36–46)
HGB BLD-MCNC: 11.2 G/DL (ref 12–16)
IRON SATN MFR SERPL: 19 % (ref 25–45)
IRON SERPL-MCNC: 52 UG/DL (ref 35–150)
MCH RBC QN AUTO: 26.6 PG (ref 26–34)
MCHC RBC AUTO-ENTMCNC: 30.2 G/DL (ref 32–36)
MCV RBC AUTO: 88 FL (ref 80–100)
NRBC BLD-RTO: 0 /100 WBCS (ref 0–0)
PLATELET # BLD AUTO: 258 X10*3/UL (ref 150–450)
RBC # BLD AUTO: 4.21 X10*6/UL (ref 4–5.2)
TIBC SERPL-MCNC: 273 UG/DL (ref 240–445)
UIBC SERPL-MCNC: 221 UG/DL (ref 110–370)
WBC # BLD AUTO: 7.4 X10*3/UL (ref 4.4–11.3)

## 2024-03-18 PROCEDURE — 82728 ASSAY OF FERRITIN: CPT

## 2024-03-18 PROCEDURE — 83540 ASSAY OF IRON: CPT

## 2024-03-18 PROCEDURE — 83550 IRON BINDING TEST: CPT

## 2024-03-18 PROCEDURE — 36415 COLL VENOUS BLD VENIPUNCTURE: CPT

## 2024-03-18 PROCEDURE — 85027 COMPLETE CBC AUTOMATED: CPT

## 2024-03-18 NOTE — RESULT ENCOUNTER NOTE
Her labs look better. Will recheck labs in 1-2 months.   I put referral in for gastro. I don't see that it is set up.

## 2024-03-19 ENCOUNTER — TELEPHONE (OUTPATIENT)
Dept: PRIMARY CARE | Facility: CLINIC | Age: 89
End: 2024-03-19
Payer: MEDICARE

## 2024-03-19 NOTE — TELEPHONE ENCOUNTER
----- Message from Brooklyn Henriquez, DO sent at 3/18/2024  5:29 PM EDT -----  Her labs look better. Will recheck labs in 1-2 months.   I put referral in for gastro. I don't see that it is set up.

## 2024-03-19 NOTE — TELEPHONE ENCOUNTER
Sp with patient.   Informed of results.   She has not scheduled with GI yet. Has a lot going on at home.

## 2024-03-22 ENCOUNTER — APPOINTMENT (OUTPATIENT)
Dept: INFUSION THERAPY | Facility: CLINIC | Age: 89
End: 2024-03-22
Payer: MEDICARE

## 2024-03-29 ENCOUNTER — APPOINTMENT (OUTPATIENT)
Dept: INFUSION THERAPY | Facility: CLINIC | Age: 89
End: 2024-03-29
Payer: MEDICARE

## 2024-04-05 ENCOUNTER — APPOINTMENT (OUTPATIENT)
Dept: INFUSION THERAPY | Facility: CLINIC | Age: 89
End: 2024-04-05
Payer: MEDICARE

## 2024-04-16 ENCOUNTER — HOSPITAL ENCOUNTER (OUTPATIENT)
Dept: OPERATING ROOM | Facility: CLINIC | Age: 89
Discharge: HOME | End: 2024-04-16
Payer: MEDICARE

## 2024-04-16 ENCOUNTER — HOSPITAL ENCOUNTER (OUTPATIENT)
Dept: RADIOLOGY | Facility: CLINIC | Age: 89
Discharge: HOME | End: 2024-04-16
Payer: MEDICARE

## 2024-04-16 VITALS
OXYGEN SATURATION: 94 % | BODY MASS INDEX: 27.14 KG/M2 | TEMPERATURE: 97.9 F | RESPIRATION RATE: 16 BRPM | HEIGHT: 62 IN | WEIGHT: 147.49 LBS | SYSTOLIC BLOOD PRESSURE: 175 MMHG | DIASTOLIC BLOOD PRESSURE: 80 MMHG | HEART RATE: 64 BPM

## 2024-04-16 DIAGNOSIS — G89.29 CHRONIC LOW BACK PAIN, UNSPECIFIED BACK PAIN LATERALITY, UNSPECIFIED WHETHER SCIATICA PRESENT: ICD-10-CM

## 2024-04-16 DIAGNOSIS — M54.50 CHRONIC LOW BACK PAIN, UNSPECIFIED BACK PAIN LATERALITY, UNSPECIFIED WHETHER SCIATICA PRESENT: ICD-10-CM

## 2024-04-16 PROCEDURE — 2500000004 HC RX 250 GENERAL PHARMACY W/ HCPCS (ALT 636 FOR OP/ED): Performed by: PAIN MEDICINE

## 2024-04-16 PROCEDURE — 2550000001 HC RX 255 CONTRASTS: Performed by: PAIN MEDICINE

## 2024-04-16 PROCEDURE — 64493 INJ PARAVERT F JNT L/S 1 LEV: CPT | Performed by: PAIN MEDICINE

## 2024-04-16 PROCEDURE — 64493 INJ PARAVERT F JNT L/S 1 LEV: CPT | Mod: 50 | Performed by: PAIN MEDICINE

## 2024-04-16 PROCEDURE — 2500000005 HC RX 250 GENERAL PHARMACY W/O HCPCS: Performed by: PAIN MEDICINE

## 2024-04-16 PROCEDURE — 64495 INJ PARAVERT F JNT L/S 3 LEV: CPT | Performed by: PAIN MEDICINE

## 2024-04-16 PROCEDURE — 64494 INJ PARAVERT F JNT L/S 2 LEV: CPT | Performed by: PAIN MEDICINE

## 2024-04-16 RX ORDER — SODIUM BICARBONATE 42 MG/ML
INJECTION, SOLUTION INTRAVENOUS AS NEEDED
Status: COMPLETED | OUTPATIENT
Start: 2024-04-16 | End: 2024-04-16

## 2024-04-16 RX ORDER — MIDAZOLAM HYDROCHLORIDE 1 MG/ML
2 INJECTION, SOLUTION INTRAMUSCULAR; INTRAVENOUS ONCE
Status: DISCONTINUED | OUTPATIENT
Start: 2024-04-17 | End: 2024-04-17 | Stop reason: HOSPADM

## 2024-04-16 RX ORDER — METHYLPREDNISOLONE ACETATE 40 MG/ML
20 INJECTION, SUSPENSION INTRA-ARTICULAR; INTRALESIONAL; INTRAMUSCULAR; SOFT TISSUE ONCE
Status: DISCONTINUED | OUTPATIENT
Start: 2024-04-16 | End: 2024-04-17 | Stop reason: HOSPADM

## 2024-04-16 RX ORDER — ROPIVACAINE HYDROCHLORIDE 5 MG/ML
10 INJECTION, SOLUTION EPIDURAL; INFILTRATION; PERINEURAL ONCE
Status: DISCONTINUED | OUTPATIENT
Start: 2024-04-16 | End: 2024-04-17 | Stop reason: HOSPADM

## 2024-04-16 RX ORDER — MIDAZOLAM HYDROCHLORIDE 1 MG/ML
INJECTION, SOLUTION INTRAMUSCULAR; INTRAVENOUS AS NEEDED
Status: COMPLETED | OUTPATIENT
Start: 2024-04-16 | End: 2024-04-16

## 2024-04-16 RX ORDER — LIDOCAINE HYDROCHLORIDE 5 MG/ML
INJECTION, SOLUTION INFILTRATION; PERINEURAL AS NEEDED
Status: COMPLETED | OUTPATIENT
Start: 2024-04-16 | End: 2024-04-16

## 2024-04-16 RX ADMIN — MIDAZOLAM 1 MG: 1 INJECTION INTRAMUSCULAR; INTRAVENOUS at 08:56

## 2024-04-16 RX ADMIN — IOHEXOL 4 ML: 240 INJECTION, SOLUTION INTRATHECAL; INTRAVASCULAR; INTRAVENOUS; ORAL at 09:02

## 2024-04-16 RX ADMIN — LIDOCAINE HYDROCHLORIDE 10 ML: 5 INJECTION, SOLUTION INFILTRATION; PERINEURAL at 08:56

## 2024-04-16 RX ADMIN — SODIUM BICARBONATE 1 MEQ: 42 INJECTION, SOLUTION INTRAVENOUS at 08:57

## 2024-04-16 ASSESSMENT — PAIN SCALES - GENERAL
PAINLEVEL_OUTOF10: 0 - NO PAIN

## 2024-04-16 ASSESSMENT — PAIN - FUNCTIONAL ASSESSMENT
PAIN_FUNCTIONAL_ASSESSMENT: 0-10

## 2024-04-16 NOTE — DISCHARGE INSTRUCTIONS
Post-injection instructions:    Your pain may not be gone immediately after the procedure--it usually takes the steroid 3-5 days to start working.   It may take several weeks for the medicine to reach its' full effect.   Pay attention to how much pain relief (what percentage compared to before the procedure) you get and for how long it lasts.   We will ask you for this at the follow up visit.     Activity: Avoid strenuous activity for 24 hours. After that return to your normal activity level.     Bandages: Remove tomorrow    Showering/Bathing: You may shower after bandage is removed     Follow up: With Dr. Vazquez over the phone in one week to discuss how you are doing  Call Summer to Schedule.   Summer's Phone:  [phone]    After hours, call the   ([phone]) and ask for the pain management answering service if you notice any of the below:     Call the doctor immediately: if you notice:    Excessive bleeding from procedure site (brisk bright red bleeding from the site or bleeding that soaks the bandages or does not stop)   Severe headache  Inability to walk, leg or arm weakness or numbness that is significantly worse after the procedure   Uncontrolled pain   Inability to control your bowels or bladder   Signs of infection: Fever above 101.5F, redness, swelling, pus or drainage from the site    May restart Eliquis Today 4/16/24

## 2024-04-16 NOTE — BRIEF OP NOTE
Date: 2024  OR Location: [facility]    Name: Liana Man, : 1935, Age: 88 y.o., MRN: [id number], Sex: female    Diagnosis  Lumbar facet syndrome * No Diagnosis Codes entered *     Procedures  Bilateral lumbar medial branch block    Surgeons   Dr. Vazquez    Resident/Fellow/Other Assistant:  Dr. Pyle    Procedure Summary  Anesthesia: * No anesthesia type entered *  ASA: ASA status not filed in the log.  Anesthesia Staff: Anesthesiologist: Og Vazquez MD  Estimated Blood Loss: 0 mL  Intra-op Medications: * Intraprocedure medication information is unavailable because the case start and end events have not been set *      Intraprocedure I/O Totals       None           Specimen: No specimens collected     Staff:   Circulator: Pablo Duffy RN  Scrub Person: Edna Zamorano RN          Procedure details:  Op Note:  After informed consent was obtained, the patient was brought to the procedure   room and placed in the prone position.  The back area was prepped and draped   in the usual sterile fashion.  Using fluoroscopic guidance in AP view, the skin and subcutaneous tissue overlying the needle trajectories to the target sites of the bilateral L3 and L4 medial branch nerves and the L5 primary dorsal rami nerves were anesthetized with 0.5% lidocaine.  22-gauge spinal needles were advanced under fluoroscopic guidance to the appropriate anatomic landmarks.  Needle tip position was confirmed in AP and oblique views initially on the right side, then on the left side.  Subsequently, 1 ml of a mixture of 0.5% Ropivacaine with 40 mg of depomedrol was injected at each needle tip.  The needles were removed.  The patient was then transferred to the recovery room in stable condition.  The patient will be assessed in recovery area for pain relief and will be scheduled for bilateral lumbar RFA if he receives >50% relief.    Complications:  None; patient tolerated the procedure well.     Disposition:  PACU - hemodynamically stable.  Condition: stable  Specimens Collected: No specimens collected  Attending Attestation: I was present and scrubbed for the entire procedure.    Dr. Vazquez

## 2024-04-16 NOTE — H&P
4/16/2024    H&P for procedure    Ms. Man is here today for her procedure. Lumbar facet medial branch block.c. her medical status the same no significant changes.    3/12/2024       Ms. Man had  interview today. She has been complaining of both back and leg weakness and pain. She had epidural steroid injection few weeks ago. She reported 100% relief for few days and pain on her back came back to base line. She also mentioned, she had many back injection over the years with other pain management team. Her MRI lumbar spine shows multiple level foraminal stenosis and facet arthropathy.      Plan  Schedule for lumbar facet MBB  L3/4 L4/5 L5/S1  Fluoro guidance   Goal to have more than 80% pain improvement      Previous Visit     Encounter Date: 1/16/2024      Signed        Expand All Collapse All    Pain Management Clinic Note      Chief Complaint: back pain and right leg weakness   Referred by: orthopedic surgery      History Of Present Illness  Liana Man is a 88 y.o. female with a past medical history of chronic back pain s/p kyphoplasty (T12 and L1) and interspinous spacer (L5-S1) done in [facility] on eliquis who presents as a new patient evaluation for chronic low back pain.      Her main complaint is axial right-sided low back pain which she states sometimes radiates into her right shoulder.  She previously had right leg radicular symptoms, but has since been going to chiropractor which has resolved right leg symptoms. She has a longstanding history of chronic low back pain and has received multiple injections as well as T12 and L1 kyphoplasties and L5-S1 interspinous spacer all done in [place].  She goes to [place] 6 months out of the year but is from the [facility].  She is mainly bothered by not being able to stand for any length of time because of her back pain.  She requires a rollator to ambulate, and when without assistive device she has to stoop forward.  She denies any leg  "symptoms but complains of bilateral lower extremity \"weakness\".  She has seen an orthopedic surgeon, Dr. Phillips, who recommends conservative management.  She has previously done PT in the past,  but not recently. She is not engaging in home exercises at this moment, but is willing to start doing exercise again. She has received multiple epidurals in the past with good relief, but states they slowly lost efficacy overtime.      Most recent imaging MRI L-spine shows moderate central stenosis at L4-5.      Previous treatments : multiple spine injections in the past, kyphoplasty (T12, L1), interspinous spacer L4-5.     The pain causes significant stress in the patient's life, specifically interferes with general activity, mood, walking ability, ability to perform tasks at home and/or work. Denies any bowel or bladder incontinence, saddle anesthesia, worsening pain, weakness or falls.     Past Medical History  She has a past medical history of Breast cancer (CMS/[facility]), Other abnormal and inconclusive findings on diagnostic imaging of breast, Other conditions influencing health status, Other conditions influencing health status, Other conditions influencing health status, Pain in unspecified hip (12/17/2015), Pain in unspecified hip (06/15/2015), Personal history of malignant neoplasm of breast (12/17/2015), and Personal history of other specified conditions.     Surgical History  She has a past surgical history that includes Breast lumpectomy (08/22/2013); Nasal septum surgery (08/22/2013); Carpal tunnel release (08/22/2013); Other surgical history (08/22/2013); Other surgical history (09/10/2019); Other surgical history (09/10/2019); and Other surgical history (12/17/2015).     Social History  She reports that she has quit smoking. Her smoking use included cigarettes. She has never used smokeless tobacco. She reports current alcohol use. She reports that she does not use drugs.     Family History  Family History       "         Family History   Problem Relation Name Age of Onset    Breast cancer Sister                Allergies  Oxycodone-acetaminophen, Oxycodone, and Tramadol     Review of Symptoms:   Constitutional: Negative for chills, diaphoresis or fever  HENT: Negative for neck swelling  Eyes:.  Negative for eye pain  Respiratory:.  Negative for cough, shortness of breath or wheezing    Cardiovascular:.  Negative for chest pain or palpitations  Gastrointestinal:.  Negative for abdominal pain, nausea and vomiting  Genitourinary:.  Negative for urgency  Musculoskeletal:  Positive for back pain. Positive for joint pain. Denies falls within the past 3 months.  Skin: Negative for wounds or itching   Neurological: Negative for dizziness, seizures, loss of consciousness and weakness  Endo/Heme/Allergies: Does not bruise/bleed easily  Psychiatric/Behavioral: Negative for depression. The patient does not appear anxious.       PHYSICAL EXAM  Vitals signs reviewed  Constitutional:       General: Not in acute distress     Appearance: Normal appearance. Not ill-appearing.  HENT:     Head: Normocephalic and atraumatic  Eyes:     Conjunctiva/sclera: Conjunctivae normal  Cardiovascular:     Rate and Rhythm: Normal rate and regular rhythm  Pulmonary:     Effort: No respiratory distress  Abdominal:     Palpations: Abdomen is soft  Musculoskeletal: POWELL  Skin:     General: Skin is warm and dry  Neurological:     General: No focal deficit present  Psychiatric:         Mood and Affect: Mood normal         Behavior: Behavior normal     Advanced Exam   Inspection: No gross deformities, no surgical scars  Palpation: + tenderness of patient of lumbar midline, +ttp of right lumbar paraspinals, no ttp to bilateral SI joints  ROM: Normal range of motion of the lumbar flexion extension  Motor: 5/5 strength upper and lower extremities  Sensory: Negative for sensory abnormalities in upper and lower extremities  Reflexes: 2+ reflexes bilateral upper and lower  "extremities, negative clonus, negative kaye's   Lumbar: Negative straight leg raising bilaterally  Sacral: Negative Seamus     Last Recorded Vitals  There were no vitals taken for this visit.     Relevant Results          Current Outpatient Medications   Medication Instructions    acetaminophen (TYLENOL) 500 mg, oral    albuterol 90 mcg/actuation inhaler 2 puffs, inhalation, Every 4 hours PRN    apixaban (ELIQUIS) 5 mg, oral, 2 times daily    calcium carbonate-vitamin D3 500 mg-5 mcg (200 unit) tablet 1 tablet, oral, Daily RT    clobetasol (Temovate) 0.05 % external solution Topical, 2 times daily    cyanocobalamin (VITAMIN B-12) 1,000 mcg, intramuscular, Every 30 days    dilTIAZem CD (CARDIZEM CD) 120 mg, oral, Every 12 hours    ferrous sulfate (325 mg ferrous sulfate) 325 mg, oral, Daily with breakfast    fluticasone (Flonase) 50 mcg/actuation nasal spray 2 sprays, Each Nostril, Daily    hydrALAZINE (APRESOLINE) 25 mg, oral, Nightly    hydrOXYzine HCL (ATARAX) 25 mg, oral, Every 8 hours PRN    losartan (COZAAR) 50 mg, oral, Daily, as directed    magnesium oxide (Mag-Ox) 400 mg (241.3 mg magnesium) tablet 1 tablet, oral, Daily    omega 3-dha-epa-fish oil 300-1,000 mg capsule,delayed release(DR/EC) oral, 2 times daily    pantoprazole (PROTONIX) 40 mg, oral, Daily    sennosides-docusate sodium (Myah-Colace) 8.6-50 mg tablet 2 tablets, oral, Daily PRN    syringe with needle 3 mL 25 gauge x 1\" syringe 1 each, miscellaneous, Every 30 days         MR lumbar spine wo IV contrast 05/06/2022     Narrative  Report generated by voice recognition software by Marcelo Guzman DO on 5/6/2022 5:25 PM.     EXAMINATION:  MRI SPINE LUMBAR W/O CONTRAST     EXAM DATE:  5/6/2022 5:17 PM     CLINICAL HISTORY:  radiculopathy lumbar region  radiculopathy, lumbar region  radiculopathy, lumbar region  Patient Reported Symptom: no  Diagnosis Info: Radiculopathy of lumbar region     ADDITIONAL CLINICAL HISTORY:  None.     COMPARISON:  No " comparisons were made when reading this study.     TECHNIQUE:  Multiplanar MR pulse sequences were performed through the lumbar spine without the use of intravenous contrast.     FINDINGS:  Mild curvature thoracolumbar spine. T12 and L1 vertebroplasty is noted. Chronic compression deformity T11 and T12. The conus is unremarkable. X-Stop device L4-L5 spinous process. Endplate bone marrow edema at L1-L2.     Segmental Analysis:     L1-L2:  5 mm retrolisthesis at L1. Advanced degenerative endplate changes seen. Facet arthropathy and ligamentum flavum hypertrophy is noted. Diffuse disc bulge, findings causing mild to moderate spinal canal stenosis. There is moderate foraminal stenosis on the right with moderate to severe on the left. No focal disc herniation seen.     L2-L3:  Mild disc bulge is noted. Facet arthropathy with ligamentum flavum hypertrophy, findings causing mild spinal canal stenosis. Mild to moderate foraminal stenosis bilaterally. No focal disc herniation is seen.     L3-L4:  Facet arthropathy with ligamentum flavum hypertrophy causing mild to moderate spinal canal stenosis. Mild foraminal narrowing bilaterally. No disc herniation or significant spinal canal or foraminal stenosis.     L4-L5:  Facet arthropathy with ligamentum flavum hypertrophy. Diffuse disc bulge with disc-osteophyte the right foramen causing moderate to severe foraminal stenosis on the right. Moderate spinal canal stenosis with mild to moderate left foraminal stenosis. No focal disc herniation seen.     L5-S1:  Diffuse disc bulge with disc-osteophyte at the foramen bilaterally. Facet arthropathy with moderate foraminal stenosis on the right and mild to moderate on the left. No significant spinal canal stenosis. No focal disc herniation seen.     Impression  1. Multilevel degenerative changes and disc disease causing spinal canal and foraminal stenosis detailed above.     No image results found.        1. Low back pain, unspecified back  pain laterality, unspecified chronicity, unspecified whether sciatica present  Referral to Pain Medicine       2. Lumbar degenerative disc disease  Referral to Pain Medicine             ASSESSMENT/PLAN  Liana Man is a 88 y.o. female with a past medical history of chronic back pain s/p kyphoplasty (T12 and L1) and interspinous spacer (L5-S1) done in [place] and [facility] on eliquis who presents for evaluation of chronic axial, right-sided low back pain.  The patient previously had right leg radicular symptoms, but it has since resolved since doing chiropractic treatment.  Her back pain is severely limiting her ability to walk, essentially she is not able to go any length of time without needing to lean forward on her rollator.  She has received multiple spine injections in the past in [place] with good relief and is amenable to a repeat epidural steroid injection.  She has been able to hold her Eliquis in the past for procedures, but she will confirm with her cardiologist that she can hold her Eliquis 5 days prior to an injection.  MRI reveals lumbar spinal stenosis at L4-5 due to disc bulge and ligamentum flavum hypertrophy as well as severe right L4-5 neural foraminal narrowing. She has completed PT in the past and has tried Tramadol and Tylenol for her pain.         Based on history, available imaging and physical exam, the patient's primary pain etiology is likely due to lumbar spinal stenosis and lumbar radiculitis.         Our plan is as follows:  -Once patient obtains clearance from cardiologist to hold Eliquis we will schedule her for a L4-5 right biased interlaminar lumbar epidural steroid injection.  - We will refer the patient physical therapy: A referral for physical therapy was provided to the patient. We discussed initiating physical therapy to help manage the patient's painful condition. The patient was counseled that muscle strengthening will improve the long term prognosis in regards to pain and  may also help increase range of motion and mobility. The patient's questions were answered and he was agreeable to this course.   - Continue pain medications as prescribed. Consider trial of gabapentin nightly if no improvement.      The patient was invited to contact us back anytime with any questions or concerns and follow-up with us in the office as needed.                                      Electronically signed by Og Vazquez MD at 3/12/2024 11:25 AM  Electronically signed by Og Vazquez MD at 3/22/2024  2:58 PM         Telemedicine on 3/12/2024            Revision History          Note viewed by patient  Additional Documentation    Flowsheets: Interfaced Flowsheet Data   Encounter Info: Billing Info,     History,     Allergies     Orders Placed     Medial Nerve Branch Block  Medication Changes      None  Medication List  Visit Diagnoses      Chronic low back pain, unspecified back pain laterality, unspecified whether sciatica present  Problem Listignificant change since last visit on March.   Same back pain mostly axial pain.

## 2024-04-30 ENCOUNTER — APPOINTMENT (OUTPATIENT)
Dept: PAIN MEDICINE | Facility: CLINIC | Age: 89
End: 2024-04-30
Payer: MEDICARE

## 2024-05-14 ENCOUNTER — HOSPITAL ENCOUNTER (OUTPATIENT)
Dept: RADIOLOGY | Facility: CLINIC | Age: 89
Discharge: HOME | End: 2024-05-14
Payer: MEDICARE

## 2024-05-14 ENCOUNTER — OFFICE VISIT (OUTPATIENT)
Dept: PRIMARY CARE | Facility: CLINIC | Age: 89
End: 2024-05-14
Payer: MEDICARE

## 2024-05-14 ENCOUNTER — LAB (OUTPATIENT)
Dept: LAB | Facility: LAB | Age: 89
End: 2024-05-14
Payer: MEDICARE

## 2024-05-14 VITALS
BODY MASS INDEX: 26.87 KG/M2 | TEMPERATURE: 97.2 F | HEART RATE: 78 BPM | WEIGHT: 146 LBS | HEIGHT: 62 IN | RESPIRATION RATE: 14 BRPM | OXYGEN SATURATION: 95 % | DIASTOLIC BLOOD PRESSURE: 78 MMHG | SYSTOLIC BLOOD PRESSURE: 144 MMHG

## 2024-05-14 DIAGNOSIS — M79.672 LEFT FOOT PAIN: ICD-10-CM

## 2024-05-14 DIAGNOSIS — M25.572 ACUTE LEFT ANKLE PAIN: ICD-10-CM

## 2024-05-14 DIAGNOSIS — D64.9 ANEMIA, UNSPECIFIED TYPE: ICD-10-CM

## 2024-05-14 DIAGNOSIS — M10.9 ACUTE GOUT OF LEFT ANKLE, UNSPECIFIED CAUSE: ICD-10-CM

## 2024-05-14 DIAGNOSIS — M10.9 ACUTE GOUT OF LEFT ANKLE, UNSPECIFIED CAUSE: Primary | ICD-10-CM

## 2024-05-14 PROCEDURE — 1157F ADVNC CARE PLAN IN RCRD: CPT | Performed by: INTERNAL MEDICINE

## 2024-05-14 PROCEDURE — 73600 X-RAY EXAM OF ANKLE: CPT | Mod: LEFT SIDE | Performed by: RADIOLOGY

## 2024-05-14 PROCEDURE — 83540 ASSAY OF IRON: CPT

## 2024-05-14 PROCEDURE — 36415 COLL VENOUS BLD VENIPUNCTURE: CPT

## 2024-05-14 PROCEDURE — 73600 X-RAY EXAM OF ANKLE: CPT | Mod: LT

## 2024-05-14 PROCEDURE — 99214 OFFICE O/P EST MOD 30 MIN: CPT | Performed by: INTERNAL MEDICINE

## 2024-05-14 PROCEDURE — 3078F DIAST BP <80 MM HG: CPT | Performed by: INTERNAL MEDICINE

## 2024-05-14 PROCEDURE — 73620 X-RAY EXAM OF FOOT: CPT | Mod: LT

## 2024-05-14 PROCEDURE — 80048 BASIC METABOLIC PNL TOTAL CA: CPT

## 2024-05-14 PROCEDURE — 1123F ACP DISCUSS/DSCN MKR DOCD: CPT | Performed by: INTERNAL MEDICINE

## 2024-05-14 PROCEDURE — 1036F TOBACCO NON-USER: CPT | Performed by: INTERNAL MEDICINE

## 2024-05-14 PROCEDURE — 84550 ASSAY OF BLOOD/URIC ACID: CPT

## 2024-05-14 PROCEDURE — 1160F RVW MEDS BY RX/DR IN RCRD: CPT | Performed by: INTERNAL MEDICINE

## 2024-05-14 PROCEDURE — 3077F SYST BP >= 140 MM HG: CPT | Performed by: INTERNAL MEDICINE

## 2024-05-14 PROCEDURE — 73620 X-RAY EXAM OF FOOT: CPT | Mod: LEFT SIDE | Performed by: RADIOLOGY

## 2024-05-14 PROCEDURE — 1159F MED LIST DOCD IN RCRD: CPT | Performed by: INTERNAL MEDICINE

## 2024-05-14 PROCEDURE — 85027 COMPLETE CBC AUTOMATED: CPT

## 2024-05-14 PROCEDURE — 83550 IRON BINDING TEST: CPT

## 2024-05-14 RX ORDER — COLCHICINE 0.6 MG/1
0.6 TABLET ORAL 2 TIMES DAILY
Qty: 60 TABLET | Refills: 0 | Status: SHIPPED | OUTPATIENT
Start: 2024-05-14 | End: 2024-06-13

## 2024-05-14 NOTE — PROGRESS NOTES
"Subjective   Patient ID: Liana Man is a 88 y.o. female who presents for Joint Swelling.    HPI   C/O swollen ankle. Started about two days ago. No injury to ankle.   Patient states it is painful, hard to walk.   Extremely painful.   Left anterior ankle and top of left foot  The toes are not affected.  She has no fever    Review of Systems   All other systems reviewed and are negative.      Objective   /78 (BP Location: Left arm, Patient Position: Sitting, BP Cuff Size: Adult)   Pulse 78   Temp 36.2 °C (97.2 °F) (Skin)   Resp 14   Ht 1.575 m (5' 2\")   Wt 66.2 kg (146 lb)   SpO2 95%   BMI 26.70 kg/m²     Physical Exam  Vitals reviewed.   Constitutional:       General: She is not in acute distress.     Appearance: Normal appearance.   Cardiovascular:      Pulses: Normal pulses.   Pulmonary:      Effort: Pulmonary effort is normal.   Musculoskeletal:         General: Swelling present.      Comments: There is left ankle pain and redness that also  encompasses the left foot not toes.  Mild erythema and warmth   Skin:     General: Skin is warm and dry.   Neurological:      Mental Status: She is alert.         Assessment/Plan   Problem List Items Addressed This Visit             ICD-10-CM    Anemia D64.9    Relevant Orders    CBC    Iron and TIBC     Other Visit Diagnoses         Codes    Acute gout of left ankle, unspecified cause    -  Primary M10.9    Relevant Medications    colchicine 0.6 mg tablet    Other Relevant Orders    Iron and TIBC    Uric acid    Acute left ankle pain     M25.572    Relevant Orders    XR ankle left 2 views    Left foot pain     M79.672    Relevant Orders    CBC    Basic Metabolic Panel    XR foot left 1-2 views        Suspect gout of right ankle/foot. Start colchicine bid.   Check labs, xrays.  Call  with any problems or questions.   Follow up on Friday.          "

## 2024-05-15 LAB
ANION GAP SERPL CALC-SCNC: 13 MMOL/L (ref 10–20)
BUN SERPL-MCNC: 19 MG/DL (ref 6–23)
CALCIUM SERPL-MCNC: 9.2 MG/DL (ref 8.6–10.6)
CHLORIDE SERPL-SCNC: 101 MMOL/L (ref 98–107)
CO2 SERPL-SCNC: 30 MMOL/L (ref 21–32)
CREAT SERPL-MCNC: 0.85 MG/DL (ref 0.5–1.05)
EGFRCR SERPLBLD CKD-EPI 2021: 67 ML/MIN/1.73M*2
ERYTHROCYTE [DISTWIDTH] IN BLOOD BY AUTOMATED COUNT: 16.1 % (ref 11.5–14.5)
GLUCOSE SERPL-MCNC: 102 MG/DL (ref 74–99)
HCT VFR BLD AUTO: 37.2 % (ref 36–46)
HGB BLD-MCNC: 11.7 G/DL (ref 12–16)
IRON SATN MFR SERPL: 5 % (ref 25–45)
IRON SERPL-MCNC: 16 UG/DL (ref 35–150)
MCH RBC QN AUTO: 28.3 PG (ref 26–34)
MCHC RBC AUTO-ENTMCNC: 31.5 G/DL (ref 32–36)
MCV RBC AUTO: 90 FL (ref 80–100)
NRBC BLD-RTO: 0 /100 WBCS (ref 0–0)
PLATELET # BLD AUTO: 266 X10*3/UL (ref 150–450)
POTASSIUM SERPL-SCNC: 4 MMOL/L (ref 3.5–5.3)
RBC # BLD AUTO: 4.14 X10*6/UL (ref 4–5.2)
SODIUM SERPL-SCNC: 140 MMOL/L (ref 136–145)
TIBC SERPL-MCNC: 306 UG/DL (ref 240–445)
UIBC SERPL-MCNC: 290 UG/DL (ref 110–370)
URATE SERPL-MCNC: 6.2 MG/DL (ref 2.3–6.7)
WBC # BLD AUTO: 7.8 X10*3/UL (ref 4.4–11.3)

## 2024-05-17 ENCOUNTER — OFFICE VISIT (OUTPATIENT)
Dept: PRIMARY CARE | Facility: CLINIC | Age: 89
End: 2024-05-17
Payer: MEDICARE

## 2024-05-17 ENCOUNTER — TELEPHONE (OUTPATIENT)
Dept: PRIMARY CARE | Facility: CLINIC | Age: 89
End: 2024-05-17

## 2024-05-17 VITALS
RESPIRATION RATE: 18 BRPM | HEART RATE: 74 BPM | DIASTOLIC BLOOD PRESSURE: 70 MMHG | TEMPERATURE: 97.2 F | SYSTOLIC BLOOD PRESSURE: 124 MMHG

## 2024-05-17 DIAGNOSIS — M79.672 LEFT FOOT PAIN: Primary | ICD-10-CM

## 2024-05-17 DIAGNOSIS — E61.1 LOW IRON: ICD-10-CM

## 2024-05-17 DIAGNOSIS — R22.42 LOCALIZED SWELLING OF LEFT FOOT: ICD-10-CM

## 2024-05-17 PROBLEM — M48.062 SPINAL STENOSIS OF LUMBAR REGION WITH NEUROGENIC CLAUDICATION: Status: ACTIVE | Noted: 2022-08-30

## 2024-05-17 PROBLEM — R73.01 ELEVATED FASTING GLUCOSE: Status: ACTIVE | Noted: 2024-05-17

## 2024-05-17 PROBLEM — H52.02 HYPERMETROPIA OF LEFT EYE: Status: ACTIVE | Noted: 2023-09-06

## 2024-05-17 PROBLEM — L57.0 ACTINIC KERATOSIS: Status: ACTIVE | Noted: 2022-03-15

## 2024-05-17 PROBLEM — N93.8 DUB (DYSFUNCTIONAL UTERINE BLEEDING): Status: ACTIVE | Noted: 2024-05-17

## 2024-05-17 PROBLEM — L70.0 ACNE VULGARIS: Status: ACTIVE | Noted: 2022-03-15

## 2024-05-17 PROBLEM — Z96.1 PSEUDOPHAKIA: Status: ACTIVE | Noted: 2023-09-06

## 2024-05-17 PROBLEM — J98.11 ATELECTASIS: Status: ACTIVE | Noted: 2018-11-30

## 2024-05-17 PROBLEM — D48.5 NEOPLASM OF UNCERTAIN BEHAVIOR OF SKIN: Status: ACTIVE | Noted: 2022-03-15

## 2024-05-17 PROBLEM — J98.6 ELEVATED HEMIDIAPHRAGM: Status: ACTIVE | Noted: 2018-11-30

## 2024-05-17 PROBLEM — C44.311 BASAL CELL CARCINOMA OF SKIN OF NOSE: Status: ACTIVE | Noted: 2022-03-15

## 2024-05-17 PROBLEM — H52.4 PRESBYOPIA: Status: ACTIVE | Noted: 2023-09-06

## 2024-05-17 PROBLEM — M81.0 OSTEOPOROSIS: Status: ACTIVE | Noted: 2022-08-30

## 2024-05-17 PROBLEM — H35.363 DEGENERATIVE RETINAL DRUSEN OF BOTH EYES: Status: ACTIVE | Noted: 2023-09-06

## 2024-05-17 PROBLEM — R73.9 HYPERGLYCEMIA: Status: ACTIVE | Noted: 2024-05-17

## 2024-05-17 PROBLEM — H52.203 ASTIGMATISM OF BOTH EYES: Status: ACTIVE | Noted: 2023-09-06

## 2024-05-17 PROBLEM — I21.A1 TYPE 2 ACUTE MYOCARDIAL INFARCTION (MULTI): Status: ACTIVE | Noted: 2018-12-03

## 2024-05-17 PROBLEM — I48.91 ATRIAL FIBRILLATION (MULTI): Status: ACTIVE | Noted: 2018-12-02

## 2024-05-17 PROBLEM — H43.813 VITREOUS DEGENERATION OF BOTH EYES: Status: ACTIVE | Noted: 2023-09-06

## 2024-05-17 PROBLEM — E04.9 ENLARGED THYROID GLAND: Status: ACTIVE | Noted: 2023-06-12

## 2024-05-17 PROBLEM — H52.11 MYOPIA OF RIGHT EYE: Status: ACTIVE | Noted: 2023-09-06

## 2024-05-17 PROBLEM — R59.9 LYMPH NODES ENLARGED: Status: ACTIVE | Noted: 2023-06-12

## 2024-05-17 PROBLEM — D62 ACUTE BLOOD LOSS ANEMIA: Status: ACTIVE | Noted: 2023-06-07

## 2024-05-17 PROBLEM — K11.7 EXCESSIVE SALIVATION: Status: ACTIVE | Noted: 2024-05-17

## 2024-05-17 PROBLEM — R50.9 FEVER: Status: ACTIVE | Noted: 2023-06-10

## 2024-05-17 PROBLEM — H49.22 SIXTH NERVE PALSY OF LEFT EYE: Status: ACTIVE | Noted: 2023-09-06

## 2024-05-17 PROBLEM — H53.2 DIPLOPIA: Status: ACTIVE | Noted: 2023-09-06

## 2024-05-17 PROBLEM — I25.10 CORONARY ARTERIOSCLEROSIS: Status: ACTIVE | Noted: 2024-05-17

## 2024-05-17 PROBLEM — Z95.5 S/P CORONARY ARTERY STENT PLACEMENT: Status: ACTIVE | Noted: 2024-05-17

## 2024-05-17 PROBLEM — C44.729 SQUAMOUS CELL CARCINOMA OF SKIN OF LEFT LOWER LIMB, INCLUDING HIP: Status: ACTIVE | Noted: 2022-03-15

## 2024-05-17 PROBLEM — H35.373 EPIRETINAL MEMBRANE (ERM) OF BOTH EYES: Status: ACTIVE | Noted: 2023-09-06

## 2024-05-17 PROBLEM — Z96.619 H/O SHOULDER REPLACEMENT: Status: ACTIVE | Noted: 2018-12-03

## 2024-05-17 PROCEDURE — 3074F SYST BP LT 130 MM HG: CPT | Performed by: INTERNAL MEDICINE

## 2024-05-17 PROCEDURE — 1160F RVW MEDS BY RX/DR IN RCRD: CPT | Performed by: INTERNAL MEDICINE

## 2024-05-17 PROCEDURE — 1123F ACP DISCUSS/DSCN MKR DOCD: CPT | Performed by: INTERNAL MEDICINE

## 2024-05-17 PROCEDURE — 99214 OFFICE O/P EST MOD 30 MIN: CPT | Performed by: INTERNAL MEDICINE

## 2024-05-17 PROCEDURE — 1159F MED LIST DOCD IN RCRD: CPT | Performed by: INTERNAL MEDICINE

## 2024-05-17 PROCEDURE — 3078F DIAST BP <80 MM HG: CPT | Performed by: INTERNAL MEDICINE

## 2024-05-17 PROCEDURE — 1036F TOBACCO NON-USER: CPT | Performed by: INTERNAL MEDICINE

## 2024-05-17 PROCEDURE — 1157F ADVNC CARE PLAN IN RCRD: CPT | Performed by: INTERNAL MEDICINE

## 2024-05-17 RX ORDER — OMEGA-3-ACID ETHYL ESTERS 1 G/1
CAPSULE, LIQUID FILLED ORAL
COMMUNITY
Start: 2024-05-14

## 2024-05-17 NOTE — PROGRESS NOTES
Subjective   Patient ID: Liana Man is a 88 y.o. female who presents for Gout (Recheck left foot/Had xray).    HPI   The pain is improved. Her left foot is swollen.   The medication is did not give her diarrhea. The pain has resolved. There is a lot of swelling making it hard to walk    She does not eat any meat.  She has decided not to see gastro. For further work up of low iron            Review of Systems   All other systems reviewed and are negative.      Objective   /70   Pulse 74   Temp 36.2 °C (97.2 °F)   Resp 18     Physical Exam  Vitals reviewed.   Constitutional:       General: She is not in acute distress.     Appearance: Normal appearance.   Cardiovascular:      Rate and Rhythm: Normal rate and regular rhythm.      Pulses: Normal pulses.      Heart sounds: Normal heart sounds.   Pulmonary:      Effort: Pulmonary effort is normal.      Breath sounds: Normal breath sounds.   Abdominal:      Tenderness: There is no abdominal tenderness.   Musculoskeletal:         General: No swelling.   Skin:     General: Skin is warm and dry.   Neurological:      Mental Status: She is alert.         Assessment/Plan   Problem List Items Addressed This Visit    None  Visit Diagnoses         Codes    Left foot pain    -  Primary M79.672    Localized swelling of left foot     R22.42    Low iron     E61.1          Her foot pain is improved. Use colchicine prn.  Keep foot elevated and can take furosemide 20 mg a day for 3 days.   She has low iron just finished the infusion in March will consider redoing  She has chronic back pain. She will discuss with her pain management just taking prn pain meds  Call  with any problems or questions.   Follow up as needed

## 2024-05-17 NOTE — TELEPHONE ENCOUNTER
----- Message from Brooklyn Henriquez DO sent at 5/16/2024  1:17 PM EDT -----  Her xrays look ok. How is she feeling on the colchicine  
Pt seen in office today.  
PAST SURGICAL HISTORY:  No significant past surgical history

## 2024-05-22 ENCOUNTER — DOCUMENTATION (OUTPATIENT)
Dept: INFUSION THERAPY | Facility: CLINIC | Age: 89
End: 2024-05-22
Payer: MEDICARE

## 2024-05-22 ENCOUNTER — TELEPHONE (OUTPATIENT)
Dept: PRIMARY CARE | Facility: CLINIC | Age: 89
End: 2024-05-22
Payer: MEDICARE

## 2024-05-22 DIAGNOSIS — D50.8 OTHER IRON DEFICIENCY ANEMIA: Primary | ICD-10-CM

## 2024-05-22 NOTE — TELEPHONE ENCOUNTER
Pt's daughter called regarding orders for infusion. She was told they still need to be signed by Dr. Spears.

## 2024-05-22 NOTE — PROGRESS NOTES
"Patient to be scheduled for acute venofer infusions.   For Diagnosis: Iron Deficiency Anemia    Labs…  Iron Studies completed on:   Lab Results   Component Value Date    IRON 16 (L) 05/14/2024    TIBC 306 05/14/2024    FERRITIN 242 (H) 03/18/2024      No results found for: \"TRANSFERRIN\"No results found for: \"TRANSFERRIN\"  Lab Results   Component Value Date    WBC 7.8 05/14/2024    HGB 11.7 (L) 05/14/2024    HCT 37.2 05/14/2024    MCV 90 05/14/2024     05/14/2024        Urine Hcg test ordered prior to first infusion?  No (If female pt <60 years of age and with reproductive capability)  (If urine Hcg test ordered please instruct pt upon scheduling to drink 32 ounces of water 1 hour before arrival so bladder is full for needed urine sample)    Last infusion received: Feb/March2024 (if continuation)    Due: Anytime    This result meets treatment criteria.    "

## 2024-05-31 ENCOUNTER — INFUSION (OUTPATIENT)
Dept: INFUSION THERAPY | Facility: CLINIC | Age: 89
End: 2024-05-31
Payer: MEDICARE

## 2024-05-31 VITALS
TEMPERATURE: 98.7 F | DIASTOLIC BLOOD PRESSURE: 66 MMHG | RESPIRATION RATE: 18 BRPM | HEART RATE: 69 BPM | OXYGEN SATURATION: 93 % | SYSTOLIC BLOOD PRESSURE: 113 MMHG

## 2024-05-31 DIAGNOSIS — D50.8 OTHER IRON DEFICIENCY ANEMIA: ICD-10-CM

## 2024-05-31 PROCEDURE — 96365 THER/PROPH/DIAG IV INF INIT: CPT | Performed by: REGISTERED NURSE

## 2024-05-31 ASSESSMENT — ENCOUNTER SYMPTOMS
MUSCULOSKELETAL NEGATIVE: 1
CARDIOVASCULAR NEGATIVE: 1
NEUROLOGICAL NEGATIVE: 1
CONSTITUTIONAL NEGATIVE: 1
GASTROINTESTINAL NEGATIVE: 1
PSYCHIATRIC NEGATIVE: 1
HEMATOLOGIC/LYMPHATIC NEGATIVE: 1
RESPIRATORY NEGATIVE: 1
EYES NEGATIVE: 1
ENDOCRINE NEGATIVE: 1

## 2024-05-31 NOTE — PROGRESS NOTES
[facility]   Infusion Clinic Note   Date: May 31, 2024   Name: Liana Man  : 1935   MRN: [id number]         Reason for Visit: OP Infusion (PATIENT HERE FOR HER VENOFER 200MG INFUSION )         Today: We administered iron sucrose (Venofer) 200 mg in sodium chloride 0.9% 100 mL IV.       Visit Type: INFUSION       Ordered By: PAM VELAZQUEZ DO      Accompanied by:Parent      Diagnosis: Other iron deficiency anemia       Allergies:   Allergies as of 2024 - Reviewed 2024   Allergen Reaction Noted    Oxycodone-acetaminophen Other 2018    Oxycodone Other 01/15/2016    Tramadol Other 01/15/2016         Current Medications has a current medication list which includes the following prescription(s): acetaminophen, albuterol, apixaban, azelastine, bd luer-rae syringe, calcium carbonate-vitamin d3, clobetasol, colchicine, cyanocobalamin, diltiazem cd, fluticasone, gabapentin, hydralazine, hydroxyzine hcl, losartan, magnesium oxide, omega 3-dha-epa-fish oil, omega-3 acid ethyl esters, pantoprazole, sennosides-docusate sodium, and syringe with needle.       Vitals:   Vitals:    24 1611   BP: 113/66   Pulse: 69   Resp: 18   Temp: 37.1 °C (98.7 °F)   SpO2: 93%             Infusion Pre-procedure Checklist:   - Allergies reviewed: yes   - Medications reviewed: yes       - Previous reaction to current treatment: no      Assess patient for the concerns below. Document provider notification as appropriate.  - Active or recent infection with/without current antibiotic use: no  - Recent or planned invasive dental work: no  - Recent or planned surgeries: no  - Recently received or plans to receive vaccinations: no  - Has treatment related toxicities: no  - Is pregnant:  n/a      Pain: 0   - Is the pain different from normal: n/a   - Is the pain tolerable: n/a   - Is your Doctor aware:  n/a      Labs: N/A         Fall Risk Screening: Brandie Fall Risk  History of  Falling, Immediate or Within 3 Months: No  Secondary Diagnosis: No  Ambulatory Aid: Walks without aid/bedrest/nurse assist  Intravenous Therapy/Heparin Lock: No  Gait/Transferring: Normal/bedrest/immobile  Mental Status: Oriented to own ability  Diego Fall Risk Score: 0       Review Of Systems:  Review of Systems   Constitutional: Negative.    HENT:  Negative.     Eyes: Negative.    Respiratory: Negative.     Cardiovascular: Negative.    Gastrointestinal: Negative.    Endocrine: Negative.    Genitourinary: Negative.     Musculoskeletal: Negative.    Skin: Negative.    Neurological: Negative.    Hematological: Negative.    Psychiatric/Behavioral: Negative.           Infusion Readiness:   - Assessment Concerns Related to Infusion: No  - Provider notified: n/a      Document Below Only If Indicated:   New Patient Education:    N/A (returning patient for continuation of therapy. Ongoing education provided as needed.)        Treatment Conditions & Drug Specific Questions:            Weight Based Drug Calculations:    WEIGHT BASED DRUGS: NOT APPLICABLE / FLAT DOSE          Initiated By: Julio Lebron RN

## 2024-05-31 NOTE — PATIENT INSTRUCTIONS
Today you received:  VENOFER 200MG INFUSION 1/5      For:    1. Other iron deficiency anemia            Please read the  Medication Guide that was given to you and reviewed during todays visit.     (Tell all doctors including dentists that you are taking this medication)     Go to the emergency room or call 911 if:  -You have signs of allergic reaction:   o         Rash, hives, itching.   o         Swollen, blistered, peeling skin.   o         Swelling of face, lips, mouth, tongue or throat.   o         Tightness of chest, trouble breathing, swallowing or talking      Call your doctor:     - If IV / injection site gets red, warm, swollen, itchy or leaks fluid or pus.     (Leave dressing on your IV site for at least 2 hours and keep area clean and dry    - If you get sick or have symptoms of infection or are not feeling well for any reason.    (Wash your hands often, stay away from people who are sick)    - If you have side effects from your medication that do not go away or are bothersome.     (Refer to the teaching your nurse gave you for side effects to call your doctor about)     Common side effects may include:  stuffy nose, headache, feeling tired, muscle aches, upset stomach    - Before receiving any vaccines, Call the [facility] at [phone]     - You get sick, are on antibiotics, have had a recent vaccine, have surgery or dental work and your doctor wants your visit rescheduled.    - You need to cancel and reschedule your visit for any reason. Call at least 2 days before your visit if you need to cancel.     - Your insurance changes before your next visit.    (We will need to get approval from your new insurance. This can take up to two weeks.)     The [facility] is opened Monday thru Friday 8am-8pm and Saturday 8am-4:30pm. We are closed on holidays.     Voice mail will take your call if the center is closed. If you leave a message please allow 24 hours for a call back  during weekdays. If you leave a message on a weekend/holiday, we will call you back the next business day.

## 2024-06-03 ENCOUNTER — INFUSION (OUTPATIENT)
Dept: INFUSION THERAPY | Facility: CLINIC | Age: 89
End: 2024-06-03
Payer: MEDICARE

## 2024-06-03 VITALS
RESPIRATION RATE: 16 BRPM | OXYGEN SATURATION: 93 % | TEMPERATURE: 97 F | HEART RATE: 81 BPM | DIASTOLIC BLOOD PRESSURE: 61 MMHG | SYSTOLIC BLOOD PRESSURE: 120 MMHG

## 2024-06-03 DIAGNOSIS — D50.8 OTHER IRON DEFICIENCY ANEMIA: ICD-10-CM

## 2024-06-03 PROCEDURE — 96365 THER/PROPH/DIAG IV INF INIT: CPT | Performed by: REGISTERED NURSE

## 2024-06-03 NOTE — PROGRESS NOTES
[facility]   Infusion Clinic Note   Date: Irma 3, 2024   Name: Liana Man  : 1935   MRN: [id number]         Reason for Visit: OP Infusion (200 mg venofer  #2 of 5)         Today: Liana Man had no medications administered during this visit.       Visit Type: INFUSION             Accompanied by:Child/Children      Diagnosis: Other iron deficiency anemia       Allergies:   Allergies as of 2024 - Reviewed 2024   Allergen Reaction Noted    Oxycodone-acetaminophen Other 2018    Oxycodone Other 01/15/2016    Tramadol Other 01/15/2016         Current Medications has a current medication list which includes the following prescription(s): acetaminophen, albuterol, apixaban, azelastine, bd luer-rae syringe, calcium carbonate-vitamin d3, clobetasol, colchicine, cyanocobalamin, diltiazem cd, fluticasone, gabapentin, hydralazine, hydroxyzine hcl, losartan, magnesium oxide, omega 3-dha-epa-fish oil, omega-3 acid ethyl esters, pantoprazole, sennosides-docusate sodium, and syringe with needle, and the following Facility-Administered Medications: iron sucrose (Venofer) 200 mg in sodium chloride 0.9% 100 mL IV.       Vitals:   Visit Vitals  /61   Pulse 81   Temp 36.1 °C (97 °F)   Resp 16   SpO2 93%   OB Status Postmenopausal   Smoking Status Former              Infusion Pre-procedure Checklist:   - Allergies reviewed: yes   - Medications reviewed: yes       - Previous reaction to current treatment: no      Assess patient for the concerns below. Document provider notification as appropriate.  - Active or recent infection with/without current antibiotic use: no  - Recent or planned invasive dental work: no  - Recent or planned surgeries: no  - Recently received or plans to receive vaccinations: no  - Has treatment related toxicities: no  - Is pregnant:  no      Pain: 8   - Is the pain different from normal: no   - Is the pain tolerable: yes   - Is your Doctor  aware:  yes      Labs: N/A         Fall Risk Screening: Brandie Fall Risk  History of Falling, Immediate or Within 3 Months: No  Secondary Diagnosis: No  Ambulatory Aid: Crutches/cane/walker  Intravenous Therapy/Heparin Lock: No  Gait/Transferring: Normal/bedrest/immobile  Mental Status: Oriented to own ability  Diego Fall Risk Score: 15       Review Of Systems:  Review of Systems - Oncology      Infusion Readiness:   - Assessment Concerns Related to Infusion: No  - Provider notified: n/a      Document Below Only If Indicated:   New Patient Education:    N/A (returning patient for continuation of therapy. Ongoing education provided as needed.)        Treatment Conditions & Drug Specific Questions:    NOT APPLICABLE        Weight Based Drug Calculations:    WEIGHT BASED DRUGS: NOT APPLICABLE / FLAT DOSE          Initiated By: Kimberly Cheng RN      All questions and concerns were addressed at time of visit. Patient was not independently evaluated by the Nurse Practitioner on site at [facility].

## 2024-06-06 ENCOUNTER — INFUSION (OUTPATIENT)
Dept: INFUSION THERAPY | Facility: CLINIC | Age: 89
End: 2024-06-06
Payer: MEDICARE

## 2024-06-06 VITALS
TEMPERATURE: 97.7 F | RESPIRATION RATE: 16 BRPM | DIASTOLIC BLOOD PRESSURE: 76 MMHG | OXYGEN SATURATION: 92 % | SYSTOLIC BLOOD PRESSURE: 147 MMHG | HEART RATE: 69 BPM

## 2024-06-06 DIAGNOSIS — D50.8 OTHER IRON DEFICIENCY ANEMIA: ICD-10-CM

## 2024-06-06 PROCEDURE — 96365 THER/PROPH/DIAG IV INF INIT: CPT | Performed by: REGISTERED NURSE

## 2024-06-06 ASSESSMENT — ENCOUNTER SYMPTOMS
MYALGIAS: 1
ARTHRALGIAS: 1

## 2024-06-06 NOTE — PROGRESS NOTES
[facility]   Infusion Clinic Note   Date: 2024   Name: Liana Man  : 1935   MRN: [id number]         Reason for Visit: OP Infusion (VENOFER 200 MG IV INFUSION. 3 OF 5.)      Accompanied by:Child/Children   Visit Type:: Infusion   Diagnosis: Other iron deficiency anemia    Allergies:   Allergies as of 2024 - Reviewed 2024   Allergen Reaction Noted    Oxycodone-acetaminophen Other 2018    Oxycodone Other 01/15/2016    Tramadol Other 01/15/2016      Current Meds has a current medication list which includes the following prescription(s): acetaminophen, albuterol, apixaban, azelastine, bd luer-rae syringe, calcium carbonate-vitamin d3, clobetasol, colchicine, cyanocobalamin, diltiazem cd, fluticasone, gabapentin, hydralazine, hydroxyzine hcl, losartan, magnesium oxide, omega 3-dha-epa-fish oil, omega-3 acid ethyl esters, pantoprazole, sennosides-docusate sodium, and syringe with needle, and the following Facility-Administered Medications: iron sucrose (Venofer) 200 mg in sodium chloride 0.9% 100 mL IV.        Vitals:  Vitals:    24 1634   BP: 147/76   Pulse: 69   Resp: 16   Temp: 36.5 °C (97.7 °F)   SpO2: 92%      Infusion Pre-procedure Checklist   Allergies reviewed: yes   Medications reviewed: yes   Contraindications to treatment:No   Previous reaction to current treatment:No   Current Health Issues: None   Pain: '    Is the pain different from normal: No   Is the pain tolerable: yes   Is your Doctor aware: yes   Contraindications based on patient history: No   Provider notified: No   Labs: Labs reviewed   Fall Risk Screening:      Review of Systems   Musculoskeletal:  Positive for arthralgias and myalgias.      Negative for complaint: [x] all other systems have been reviewed and are negative for complaint   Infusion Readiness:   Assessment Concerns Related to Infusion: No  Provider notified: no  Assess patient for the concerns below. Document  provider notification as appropriate:  - Does not meet criteria to treat No  - Has an active or recent infection with/without current antibiotic use No  - Has recent/planned dental work No  - Has recent/planned surgeries No  - Has recently received or plans to receive vaccinations No  - Has treatment related toxicities No  - Is pregnant (unless noted otherwise) N/A    Initiated By: Gaby Santos RN   Time: 5:08 PM     We administered iron sucrose (Venofer) 200 mg in sodium chloride 0.9% 100 mL IV.        All questions and concerns were addressed at time of visit. Patient was not independently evaluated by the Nurse Practitioner on site at [facility].

## 2024-06-06 NOTE — PATIENT INSTRUCTIONS
How Severe Are Your Spot(S)?: moderate Today you received:     VENOFER 200 MG IV INFUSION.  DOSE 3 OF 5 TODAY.    LAST TWO APPTS SCHEDULED.     For:    1. Other iron deficiency anemia            Please read the  Medication Guide that was given to you and reviewed during todays visit.     (Tell all doctors including dentists that you are taking this medication)     Go to the emergency room or call 911 if:  -You have signs of allergic reaction:   o         Rash, hives, itching.   o         Swollen, blistered, peeling skin.   o         Swelling of face, lips, mouth, tongue or throat.   o         Tightness of chest, trouble breathing, swallowing or talking      Call your doctor:     - If IV / injection site gets red, warm, swollen, itchy or leaks fluid or pus.     (Leave dressing on your IV site for at least 2 hours and keep area clean and dry    - If you get sick or have symptoms of infection or are not feeling well for any reason.    (Wash your hands often, stay away from people who are sick)    - If you have side effects from your medication that do not go away or are bothersome.     (Refer to the teaching your nurse gave you for side effects to call your doctor about)     Common side effects may include:  stuffy nose, headache, feeling tired, muscle aches, upset stomach    - Before receiving any vaccines, Call the [facility] at [phone]     - You get sick, are on antibiotics, have had a recent vaccine, have surgery or dental work and your doctor wants your visit rescheduled.    - You need to cancel and reschedule your visit for any reason. Call at least 2 days before your visit if you need to cancel.     - Your insurance changes before your next visit.    (We will need to get approval from your new insurance. This can take up to two weeks.)     [facility] is opened Monday thru Friday 8am-8pm and Saturday 8am-4:30pm. We are closed on holidays.     Voice mail will take your call if the center is closed. If you  Have Your Spot(S) Been Treated In The Past?: has not been treated leave a message please allow 24 hours for a call back during weekdays. If you leave a message on a weekend/holiday, we will call you back the next business day.    Hpi Title: Evaluation of Skin Lesions

## 2024-06-10 ENCOUNTER — INFUSION (OUTPATIENT)
Dept: INFUSION THERAPY | Facility: CLINIC | Age: 89
End: 2024-06-10
Payer: MEDICARE

## 2024-06-10 DIAGNOSIS — D50.8 OTHER IRON DEFICIENCY ANEMIA: ICD-10-CM

## 2024-06-10 PROCEDURE — 96365 THER/PROPH/DIAG IV INF INIT: CPT | Performed by: REGISTERED NURSE

## 2024-06-10 ASSESSMENT — ENCOUNTER SYMPTOMS
FATIGUE: 1
ARTHRALGIAS: 1

## 2024-06-10 NOTE — PATIENT INSTRUCTIONS
Today :We administered iron sucrose (Venofer) 200 mg in sodium chloride 0.9% 100 mL IV.     For:   1. Other iron deficiency anemia         Your next appointment is due in:  TBD        Please read the  Medication Guide that was given to you and reviewed during todays visit.     (Tell all doctors including dentists that you are taking this medication)     Go to the emergency room or call 911 if:  -You have signs of allergic reaction:   -Rash, hives, itching.   -Swollen, blistered, peeling skin.   -Swelling of face, lips, mouth, tongue or throat.   -Tightness of chest, trouble breathing, swallowing or talking     Call your doctor:  - If IV / injection site gets red, warm, swollen, itchy or leaks fluid or pus.     (Leave dressing on your IV site for at least 2 hours and keep area clean and dry  - If you get sick or have symptoms of infection or are not feeling well for any reason.    (Wash your hands often, stay away from people who are sick)  - If you have side effects from your medication that do not go away or are bothersome.     (Refer to the teaching your nurse gave you for side effects to call your doctor about)    - Common side effects may include:  stuffy nose, headache, feeling tired, muscle aches, upset stomach  - Before receiving any vaccines     - Call the [facility] at   If:  - You get sick, are on antibiotics, have had a recent vaccine, have surgery or dental work and your doctor wants your visit rescheduled.  - You need to cancel and reschedule your visit for any reason. Call at least 2 days before your visit if you need to cancel.   - Your insurance changes before your next visit.    (We will need to get approval from your new insurance. This can take up to two weeks.)     [facility] is opened Monday thru Friday. We are closed on weekends and holidays.   Voice mail will take your call if the center is closed. If you leave a message please allow 24 hours for a call  back during weekdays. If you leave a message on a weekend/holiday, we will call you back the next business day.

## 2024-06-10 NOTE — PROGRESS NOTES
[facility]   Infusion Clinic Note   Date: Irma 10, 2024   Name: Liana Man  : 1935   MRN: [id number]         Reason for Visit: OP Infusion (Venofer 200)         Today: We administered iron sucrose (Venofer) 200 mg in sodium chloride 0.9% 100 mL IV.       Visit Type: INFUSION       Ordered By: Florence      Accompanied by:Relative      Diagnosis: Other iron deficiency anemia       Allergies:   Allergies as of 06/10/2024 - Reviewed 06/10/2024   Allergen Reaction Noted    Oxycodone-acetaminophen Other 2018    Oxycodone Other 01/15/2016    Tramadol Other 01/15/2016         Current Medications has a current medication list which includes the following prescription(s): acetaminophen, albuterol, apixaban, azelastine, bd luer-rae syringe, calcium carbonate-vitamin d3, clobetasol, colchicine, cyanocobalamin, diltiazem cd, fluticasone, gabapentin, hydralazine, hydroxyzine hcl, losartan, magnesium oxide, omega 3-dha-epa-fish oil, omega-3 acid ethyl esters, pantoprazole, sennosides-docusate sodium, and syringe with needle, and the following Facility-Administered Medications: iron sucrose (Venofer) 200 mg in sodium chloride 0.9% 100 mL IV.       Vitals:   There were no vitals filed for this visit.          Infusion Pre-procedure Checklist:   - Allergies reviewed: yes   - Medications reviewed: yes       - Previous reaction to current treatment: no      Assess patient for the concerns below. Document provider notification as appropriate.  - Active or recent infection with/without current antibiotic use: no  - Recent or planned invasive dental work: no  - Recent or planned surgeries: no  - Recently received or plans to receive vaccinations: no  - Has treatment related toxicities: no  - Is pregnant:  no      Pain: 0   - Is the pain different from normal: n/a   - Is the pain tolerable: n/a   - Is your Doctor aware:  n/a      Labs: N/A         Fall Risk Screening: Brandie Fall Risk  History of  Falling, Immediate or Within 3 Months: No  Ambulatory Aid: Walks without aid/bedrest/nurse assist  Intravenous Therapy/Heparin Lock: Yes  Gait/Transferring: Normal/bedrest/immobile  Mental Status: Oriented to own ability       Review Of Systems:  Review of Systems   Constitutional:  Positive for fatigue.   Musculoskeletal:  Positive for arthralgias.   All other systems reviewed and are negative.        Infusion Readiness:   - Assessment Concerns Related to Infusion: No  - Provider notified: no      Document Below Only If Indicated:   New Patient Education:    N/A (returning patient for continuation of therapy. Ongoing education provided as needed.)        Treatment Conditions & Drug Specific Questions:    NOT APPLICABLE        Weight Based Drug Calculations:    WEIGHT BASED DRUGS: NOT APPLICABLE / FLAT DOSE          Initiated By: Lizzie Sauceda RN          All questions and concerns were addressed at time of visit. Patient was not independently evaluated by the Nurse Practitioner on site at [facility].

## 2024-06-11 ENCOUNTER — TELEPHONE (OUTPATIENT)
Dept: PRIMARY CARE | Facility: CLINIC | Age: 89
End: 2024-06-11
Payer: MEDICARE

## 2024-06-11 DIAGNOSIS — G89.29 OTHER CHRONIC BACK PAIN: ICD-10-CM

## 2024-06-11 DIAGNOSIS — M54.9 OTHER CHRONIC BACK PAIN: ICD-10-CM

## 2024-06-11 RX ORDER — GABAPENTIN 100 MG/1
CAPSULE ORAL
Qty: 90 CAPSULE | Refills: 1 | Status: SHIPPED | OUTPATIENT
Start: 2024-06-11

## 2024-06-13 ENCOUNTER — APPOINTMENT (OUTPATIENT)
Dept: INFUSION THERAPY | Facility: CLINIC | Age: 89
End: 2024-06-13
Payer: MEDICARE

## 2024-06-13 VITALS
HEART RATE: 75 BPM | OXYGEN SATURATION: 92 % | TEMPERATURE: 97.1 F | SYSTOLIC BLOOD PRESSURE: 147 MMHG | DIASTOLIC BLOOD PRESSURE: 73 MMHG | RESPIRATION RATE: 18 BRPM

## 2024-06-13 DIAGNOSIS — D50.8 OTHER IRON DEFICIENCY ANEMIA: ICD-10-CM

## 2024-06-13 PROCEDURE — 96365 THER/PROPH/DIAG IV INF INIT: CPT | Performed by: REGISTERED NURSE

## 2024-06-13 NOTE — PROGRESS NOTES
[facility]   Infusion Clinic Note   Date: 2024   Name: Liana Man  : 1935   MRN: [id number]         Reason for Visit: Venofer 200mg 5 of 5 Infusion    Accompanied by:Self   Visit Type:: Infusion   Diagnosis: Other iron deficiency anemia    Allergies:   Allergies as of 2024 - Reviewed 06/10/2024   Allergen Reaction Noted    Oxycodone-acetaminophen Other 2018    Oxycodone Other 01/15/2016    Tramadol Other 01/15/2016      Current Meds has a current medication list which includes the following prescription(s): acetaminophen, albuterol, apixaban, azelastine, bd luer-rae syringe, calcium carbonate-vitamin d3, clobetasol, colchicine, cyanocobalamin, diltiazem cd, fluticasone, gabapentin, hydralazine, hydroxyzine hcl, losartan, magnesium oxide, omega 3-dha-epa-fish oil, omega-3 acid ethyl esters, pantoprazole, sennosides-docusate sodium, and syringe with needle.        Vitals:  Vitals:    24 1556   BP: 147/73   Pulse: 75   Resp: 18   Temp: 36.2 °C (97.1 °F)   SpO2: 92%      Infusion Pre-procedure Checklist   Allergies reviewed: yes   Medications reviewed: yes   Contraindications to treatment:No   Previous reaction to current treatment:No   Current Health Issues: None   Pain: '    Is the pain different from normal: No   Is the pain tolerable: n/a   Is your Doctor aware: n/a   Contraindications based on patient history: No   Provider notified: Not applicable   Labs: Labs reviewed   Fall Risk Screening:      Review of Systems - Oncology   Negative for complaint: [] all other systems have been reviewed and are negative for complaint   Infusion Readiness:   Assessment Concerns Related to Infusion: No  Provider notified: n/a  Assess patient for the concerns below. Document provider notification as appropriate:  - Does not meet criteria to treat No  - Has an active or recent infection with/without current antibiotic use No  - Has recent/planned dental work No  -  Has recent/planned surgeries No  - Has recently received or plans to receive vaccinations No  - Has treatment related toxicities No  - Is pregnant (unless noted otherwise) N/A    Initiated By: Megha Maloney RN   Time: 4:53 PM     We administered iron sucrose (Venofer) 200 mg in sodium chloride 0.9% 110 mL IV.        All questions and concerns were addressed at time of visit. Patient was not independently evaluated by the Nurse Practitioner on site at [facility].

## 2024-06-13 NOTE — PATIENT INSTRUCTIONS
Today :We administered iron sucrose (Venofer) 200 mg in sodium chloride 0.9% 110 mL IV.     For:   1. Other iron deficiency anemia         Your next appointment is due in:  Follow up with your provider        Please read the  Medication Guide that was given to you and reviewed during todays visit.     (Tell all doctors including dentists that you are taking this medication)     Go to the emergency room or call 911 if:  -You have signs of allergic reaction:   -Rash, hives, itching.   -Swollen, blistered, peeling skin.   -Swelling of face, lips, mouth, tongue or throat.   -Tightness of chest, trouble breathing, swallowing or talking     Call your doctor:  - If IV / injection site gets red, warm, swollen, itchy or leaks fluid or pus.     (Leave dressing on your IV site for at least 2 hours and keep area clean and dry  - If you get sick or have symptoms of infection or are not feeling well for any reason.    (Wash your hands often, stay away from people who are sick)  - If you have side effects from your medication that do not go away or are bothersome.     (Refer to the teaching your nurse gave you for side effects to call your doctor about)    - Common side effects may include:  stuffy nose, headache, feeling tired, muscle aches, upset stomach  - Before receiving any vaccines     - Call the [facility] at  [phone] If:  - You get sick, are on antibiotics, have had a recent vaccine, have surgery or dental work and your doctor wants your visit rescheduled.  - You need to cancel and reschedule your visit for any reason. Call at least 2 days before your visit if you need to cancel.   - Your insurance changes before your next visit.    (We will need to get approval from your new insurance. This can take up to two weeks.)     [facility] is opened Monday thru Friday, Saturdays. We are closed on Sundays and holidays.   Voice mail will take your call if the center is closed. If you  leave a message please allow 24 hours for a call back during weekdays. If you leave a message on a weekend/holiday, we will call you back the next business day.

## 2024-06-15 ENCOUNTER — APPOINTMENT (OUTPATIENT)
Dept: CARDIOLOGY | Facility: HOSPITAL | Age: 89
End: 2024-06-15
Payer: MEDICARE

## 2024-06-15 ENCOUNTER — HOSPITAL ENCOUNTER (EMERGENCY)
Facility: HOSPITAL | Age: 89
Discharge: HOME | End: 2024-06-15
Attending: EMERGENCY MEDICINE
Payer: MEDICARE

## 2024-06-15 ENCOUNTER — APPOINTMENT (OUTPATIENT)
Dept: RADIOLOGY | Facility: HOSPITAL | Age: 89
End: 2024-06-15
Payer: MEDICARE

## 2024-06-15 VITALS
TEMPERATURE: 98.4 F | WEIGHT: 140 LBS | HEART RATE: 73 BPM | HEIGHT: 62 IN | RESPIRATION RATE: 18 BRPM | BODY MASS INDEX: 25.76 KG/M2 | DIASTOLIC BLOOD PRESSURE: 88 MMHG | OXYGEN SATURATION: 94 % | SYSTOLIC BLOOD PRESSURE: 169 MMHG

## 2024-06-15 DIAGNOSIS — I10 ASYMPTOMATIC HYPERTENSION: Primary | ICD-10-CM

## 2024-06-15 LAB
ALBUMIN SERPL BCP-MCNC: 4.3 G/DL (ref 3.4–5)
ALP SERPL-CCNC: 69 U/L (ref 33–136)
ALT SERPL W P-5'-P-CCNC: 14 U/L (ref 7–45)
ANION GAP SERPL CALC-SCNC: 15 MMOL/L (ref 10–20)
AST SERPL W P-5'-P-CCNC: 21 U/L (ref 9–39)
BASOPHILS # BLD AUTO: 0.03 X10*3/UL (ref 0–0.1)
BASOPHILS NFR BLD AUTO: 0.5 %
BILIRUB SERPL-MCNC: 0.7 MG/DL (ref 0–1.2)
BUN SERPL-MCNC: 20 MG/DL (ref 6–23)
CALCIUM SERPL-MCNC: 9.6 MG/DL (ref 8.6–10.3)
CARDIAC TROPONIN I PNL SERPL HS: 12 NG/L (ref 0–13)
CARDIAC TROPONIN I PNL SERPL HS: 12 NG/L (ref 0–13)
CHLORIDE SERPL-SCNC: 104 MMOL/L (ref 98–107)
CO2 SERPL-SCNC: 26 MMOL/L (ref 21–32)
CREAT SERPL-MCNC: 0.89 MG/DL (ref 0.5–1.05)
EGFRCR SERPLBLD CKD-EPI 2021: 63 ML/MIN/1.73M*2
EOSINOPHIL # BLD AUTO: 0.26 X10*3/UL (ref 0–0.4)
EOSINOPHIL NFR BLD AUTO: 4.3 %
ERYTHROCYTE [DISTWIDTH] IN BLOOD BY AUTOMATED COUNT: 15 % (ref 11.5–14.5)
GLUCOSE SERPL-MCNC: 86 MG/DL (ref 74–99)
HCT VFR BLD AUTO: 41.2 % (ref 36–46)
HGB BLD-MCNC: 13.2 G/DL (ref 12–16)
IMM GRANULOCYTES # BLD AUTO: 0.02 X10*3/UL (ref 0–0.5)
IMM GRANULOCYTES NFR BLD AUTO: 0.3 % (ref 0–0.9)
INR PPP: 1.7 (ref 0.9–1.1)
LYMPHOCYTES # BLD AUTO: 1.23 X10*3/UL (ref 0.8–3)
LYMPHOCYTES NFR BLD AUTO: 20.5 %
MAGNESIUM SERPL-MCNC: 1.76 MG/DL (ref 1.6–2.4)
MCH RBC QN AUTO: 28.8 PG (ref 26–34)
MCHC RBC AUTO-ENTMCNC: 32 G/DL (ref 32–36)
MCV RBC AUTO: 90 FL (ref 80–100)
MONOCYTES # BLD AUTO: 0.77 X10*3/UL (ref 0.05–0.8)
MONOCYTES NFR BLD AUTO: 12.9 %
NEUTROPHILS # BLD AUTO: 3.68 X10*3/UL (ref 1.6–5.5)
NEUTROPHILS NFR BLD AUTO: 61.5 %
NRBC BLD-RTO: 0 /100 WBCS (ref 0–0)
PLATELET # BLD AUTO: 260 X10*3/UL (ref 150–450)
POTASSIUM SERPL-SCNC: 3.7 MMOL/L (ref 3.5–5.3)
PROT SERPL-MCNC: 6.7 G/DL (ref 6.4–8.2)
PROTHROMBIN TIME: 19.7 SECONDS (ref 9.8–12.8)
RBC # BLD AUTO: 4.59 X10*6/UL (ref 4–5.2)
SODIUM SERPL-SCNC: 141 MMOL/L (ref 136–145)
WBC # BLD AUTO: 6 X10*3/UL (ref 4.4–11.3)

## 2024-06-15 PROCEDURE — 99284 EMERGENCY DEPT VISIT MOD MDM: CPT | Mod: 25

## 2024-06-15 PROCEDURE — 99285 EMERGENCY DEPT VISIT HI MDM: CPT | Performed by: EMERGENCY MEDICINE

## 2024-06-15 PROCEDURE — 93005 ELECTROCARDIOGRAM TRACING: CPT

## 2024-06-15 PROCEDURE — 71045 X-RAY EXAM CHEST 1 VIEW: CPT

## 2024-06-15 PROCEDURE — 84484 ASSAY OF TROPONIN QUANT: CPT

## 2024-06-15 PROCEDURE — 84484 ASSAY OF TROPONIN QUANT: CPT | Mod: 91

## 2024-06-15 PROCEDURE — 71045 X-RAY EXAM CHEST 1 VIEW: CPT | Performed by: STUDENT IN AN ORGANIZED HEALTH CARE EDUCATION/TRAINING PROGRAM

## 2024-06-15 PROCEDURE — 80053 COMPREHEN METABOLIC PANEL: CPT

## 2024-06-15 PROCEDURE — 85025 COMPLETE CBC W/AUTO DIFF WBC: CPT

## 2024-06-15 PROCEDURE — 36415 COLL VENOUS BLD VENIPUNCTURE: CPT

## 2024-06-15 PROCEDURE — 83735 ASSAY OF MAGNESIUM: CPT

## 2024-06-15 PROCEDURE — 2500000004 HC RX 250 GENERAL PHARMACY W/ HCPCS (ALT 636 FOR OP/ED)

## 2024-06-15 PROCEDURE — 96374 THER/PROPH/DIAG INJ IV PUSH: CPT

## 2024-06-15 PROCEDURE — 85610 PROTHROMBIN TIME: CPT

## 2024-06-15 RX ORDER — HYDRALAZINE HYDROCHLORIDE 20 MG/ML
10 INJECTION INTRAMUSCULAR; INTRAVENOUS ONCE
Status: COMPLETED | OUTPATIENT
Start: 2024-06-15 | End: 2024-06-15

## 2024-06-15 RX ADMIN — HYDRALAZINE HYDROCHLORIDE 10 MG: 20 INJECTION INTRAMUSCULAR; INTRAVENOUS at 20:46

## 2024-06-15 ASSESSMENT — PAIN SCALES - GENERAL: PAINLEVEL_OUTOF10: 5 - MODERATE PAIN

## 2024-06-15 ASSESSMENT — PAIN DESCRIPTION - PAIN TYPE: TYPE: ACUTE PAIN

## 2024-06-15 ASSESSMENT — PAIN DESCRIPTION - ORIENTATION: ORIENTATION: LOWER

## 2024-06-15 ASSESSMENT — LIFESTYLE VARIABLES
EVER HAD A DRINK FIRST THING IN THE MORNING TO STEADY YOUR NERVES TO GET RID OF A HANGOVER: NO
HAVE PEOPLE ANNOYED YOU BY CRITICIZING YOUR DRINKING: NO
TOTAL SCORE: 0
HAVE YOU EVER FELT YOU SHOULD CUT DOWN ON YOUR DRINKING: NO
EVER FELT BAD OR GUILTY ABOUT YOUR DRINKING: NO

## 2024-06-15 ASSESSMENT — PAIN - FUNCTIONAL ASSESSMENT: PAIN_FUNCTIONAL_ASSESSMENT: 0-10

## 2024-06-15 ASSESSMENT — PAIN DESCRIPTION - LOCATION: LOCATION: BACK

## 2024-06-15 NOTE — ED PROVIDER NOTES
EMERGENCY DEPARTMENT ENCOUNTER      Pt Name: Liana Man  MRN: [id number]  Birthdate 1935  Date of evaluation: 6/15/2024  Provider: Chau Trejo MD    CHIEF COMPLAINT       Chief Complaint   Patient presents with    Hypertension     Pt reported high BP at home today. Pt states that she had a bout of CP yesterday but it resolved yesterday. Denies CP/ SOB at this time.            HISTORY OF PRESENT ILLNESS    HPI  Patient is an 88-year-old female with a history of A-fib on Eliquis, HTN, HLD, CKD, CAD presenting with elevated blood pressures.  This is been a problem since yesterday.  She has had systolics above 200s despite being compliant with her medications.  She had an episode of chest pain briefly yesterday but is currently asymptomatic.  Denying fevers, chills, headache, lightheadedness, visual changes, chest pain, shortness of breath, nausea, vomiting, change in bowel or bladder.    Nursing Notes were reviewed.    PAST MEDICAL HISTORY     Past Medical History:   Diagnosis Date    A-fib (Multi)     Breast cancer (Multi)     GERD (gastroesophageal reflux disease)     HTN (hypertension)     Other abnormal and inconclusive findings on diagnostic imaging of breast     Mammogram abnormal    Other conditions influencing health status     L Breast Inf Med Quad Prev Diffuse Calcification Increased    Other conditions influencing health status     Malignant Female Breast Neoplasm Of The Upper Outer Quadrant    Other conditions influencing health status     Breast Cancer    Pain in unspecified hip 12/17/2015    Joint pain, hip    Pain in unspecified hip 06/15/2015    Hip pain    Personal history of malignant neoplasm of breast 12/17/2015    History of malignant neoplasm of breast    Personal history of other specified conditions     History of lymphadenopathy         SURGICAL HISTORY       Past Surgical History:   Procedure Laterality Date    BREAST LUMPECTOMY  08/22/2013    Right Breast Lumpectomy    CARPAL  "TUNNEL RELEASE  08/22/2013    Neuroplasty Decompression Median Nerve At Carpal Tunnel    CORONARY STENT PLACEMENT      NASAL SEPTUM SURGERY  08/22/2013    Nasal Septal Deviation Repair    OTHER SURGICAL HISTORY  08/22/2013    Hysteroscopy    OTHER SURGICAL HISTORY  09/10/2019    Shoulder replacement    OTHER SURGICAL HISTORY  09/10/2019    Shoulder surgery    OTHER SURGICAL HISTORY  12/17/2015    Reported Hx Of Hip Replacement - Left Side         CURRENT MEDICATIONS       Discharge Medication List as of 6/15/2024  9:20 PM        CONTINUE these medications which have NOT CHANGED    Details   acetaminophen (Tylenol) 500 mg tablet Take 1 tablet (500 mg) by mouth. As needed, Historical Med      albuterol 90 mcg/actuation inhaler Inhale 2 puffs every 4 hours if needed., Starting Mon 12/29/2014, Historical Med      apixaban (Eliquis) 5 mg tablet Take 1 tablet (5 mg) by mouth twice a day., Starting Thu 12/6/2018, Historical Med      azelastine (Astelin) 137 mcg (0.1 %) nasal spray Administer 1 spray into each nostril 2 times a day. Use in each nostril as directed, Starting Tue 2/13/2024, Until Wed 2/12/2025, Normal      !! BD Luer-Dae Syringe 3 mL 23 x 1\" syringe AS DIRECTED, Historical Med      calcium carbonate-vitamin D3 500 mg-5 mcg (200 unit) tablet Take 1 tablet by mouth once daily., Historical Med      clobetasol (Temovate) 0.05 % external solution Apply topically 2 times a day., Starting Tue 8/29/2023, Normal      cyanocobalamin (Vitamin B-12) 1,000 mcg/mL injection Inject 1 mL (1,000 mcg) into the shoulder, thigh, or buttocks every 30 (thirty) days., Starting Fri 5/19/2023, Normal      dilTIAZem CD (Cardizem CD) 120 mg 24 hr capsule Take 1 capsule (120 mg) by mouth every 12 hours., Starting Tue 8/8/2023, Historical Med      fluticasone (Flonase) 50 mcg/actuation nasal spray Administer 2 sprays into each nostril once daily., Starting Tue 8/29/2023, Normal      gabapentin (Neurontin) 100 mg capsule Take 1 pill at " "bedtime. May titrate up to 3 pills at bedtime if tolerating, Normal      hydrALAZINE (Apresoline) 25 mg tablet Take 1 tablet (25 mg) by mouth once daily at bedtime., Historical Med      hydrOXYzine HCL (Atarax) 25 mg tablet Take 1 tablet (25 mg) by mouth every 8 hours if needed., Historical Med      losartan (Cozaar) 50 mg tablet Take 1 tablet (50 mg) by mouth once daily. as directed, Starting Thu 9/7/2023, Normal      magnesium oxide (Mag-Ox) 400 mg (241.3 mg magnesium) tablet Take 1 tablet (400 mg) by mouth once daily., Starting Tue 9/17/2013, Historical Med      omega 3-dha-epa-fish oil 300-1,000 mg capsule,delayed release(DR/EC) Take by mouth twice a day., Historical Med      omega-3 acid ethyl esters (Lovaza) 1 gram capsule Historical Med      pantoprazole (ProtoNix) 40 mg EC tablet Take 1 tablet (40 mg) by mouth once daily., Starting Wed 2/14/2024, Normal      sennosides-docusate sodium (Myah-Colace) 8.6-50 mg tablet Take 2 tablets by mouth once daily as needed., Starting Thu 12/6/2018, Historical Med      !! syringe with needle 3 mL 25 gauge x 1\" syringe 1 each every 30 (thirty) days., Starting Fri 5/19/2023, Normal       !! - Potential duplicate medications found. Please discuss with provider.          ALLERGIES     Oxycodone-acetaminophen, Oxycodone, and Tramadol    FAMILY HISTORY       Family History   Problem Relation Name Age of Onset    Breast cancer Sister            SOCIAL HISTORY       Social History     Socioeconomic History    Marital status:      Spouse name: None    Number of children: None    Years of education: None    Highest education level: None   Occupational History    None   Tobacco Use    Smoking status: Former     Types: Cigarettes    Smokeless tobacco: Never   Vaping Use    Vaping status: Never Used   Substance and Sexual Activity    Alcohol use: Yes     Comment: SOCIALLY    Drug use: Never    Sexual activity: None   Other Topics Concern    None   Social History Narrative    " None     Social Determinants of Health     Financial Resource Strain: Not on file   Food Insecurity: Not on file   Transportation Needs: Not on file   Physical Activity: Not on file   Stress: Not on file   Social Connections: Not on file   Intimate Partner Violence: Not on file   Housing Stability: Not on file       SCREENINGS                        PHYSICAL EXAM    (up to 7 for level 4, 8 or more for level 5)     ED Triage Vitals [06/15/24 1738]   Temperature Heart Rate Respirations BP   36.9 °C (98.4 °F) 64 20 171/77      Pulse Ox Temp Source Heart Rate Source Patient Position   97 % Temporal Monitor Sitting      BP Location FiO2 (%)     Right arm --       Physical Exam  Vitals and nursing note reviewed.   Constitutional:       General: She is not in acute distress.     Appearance: She is not ill-appearing.   HENT:      Head: Normocephalic and atraumatic.      Nose: Nose normal.      Mouth/Throat:      Mouth: Mucous membranes are moist.      Pharynx: Oropharynx is clear.   Eyes:      Extraocular Movements: Extraocular movements intact.      Conjunctiva/sclera: Conjunctivae normal.   Cardiovascular:      Rate and Rhythm: Normal rate and regular rhythm.      Pulses: Normal pulses.      Heart sounds: Normal heart sounds.   Pulmonary:      Effort: Pulmonary effort is normal. No respiratory distress.      Breath sounds: Normal breath sounds.   Abdominal:      General: There is no distension.      Palpations: Abdomen is soft.      Tenderness: There is no abdominal tenderness.   Musculoskeletal:      Cervical back: Normal range of motion and neck supple.      Right lower leg: No edema.      Left lower leg: No edema.   Skin:     General: Skin is warm and dry.      Capillary Refill: Capillary refill takes less than 2 seconds.   Neurological:      General: No focal deficit present.          DIAGNOSTIC RESULTS     LABS:  Labs Reviewed   CBC WITH AUTO DIFFERENTIAL - Abnormal       Result Value    WBC 6.0      nRBC 0.0       RBC 4.59      Hemoglobin 13.2      Hematocrit 41.2      MCV 90      MCH 28.8      MCHC 32.0      RDW 15.0 (*)     Platelets 260      Neutrophils % 61.5      Immature Granulocytes %, Automated 0.3      Lymphocytes % 20.5      Monocytes % 12.9      Eosinophils % 4.3      Basophils % 0.5      Neutrophils Absolute 3.68      Immature Granulocytes Absolute, Automated 0.02      Lymphocytes Absolute 1.23      Monocytes Absolute 0.77      Eosinophils Absolute 0.26      Basophils Absolute 0.03     PROTIME-INR - Abnormal    Protime 19.7 (*)     INR 1.7 (*)    COMPREHENSIVE METABOLIC PANEL - Normal    Glucose 86      Sodium 141      Potassium 3.7      Chloride 104      Bicarbonate 26      Anion Gap 15      Urea Nitrogen 20      Creatinine 0.89      eGFR 63      Calcium 9.6      Albumin 4.3      Alkaline Phosphatase 69      Total Protein 6.7      AST 21      Bilirubin, Total 0.7      ALT 14     MAGNESIUM - Normal    Magnesium 1.76     SERIAL TROPONIN-INITIAL - Normal    Troponin I, High Sensitivity 12      Narrative:     Less than 99th percentile of normal range cutoff-  Female and children under 18 years old <14 ng/L; Male <21 ng/L: Negative  Repeat testing should be performed if clinically indicated.     Female and children under 18 years old 14-50 ng/L; Male 21-50 ng/L:  Consistent with possible cardiac damage and possible increased clinical   risk. Serial measurements may help to assess extent of myocardial damage.     >50 ng/L: Consistent with cardiac damage, increased clinical risk and  myocardial infarction. Serial measurements may help assess extent of   myocardial damage.      NOTE: Children less than 1 year old may have higher baseline troponin   levels and results should be interpreted in conjunction with the overall   clinical context.     NOTE: Troponin I testing is performed using a different   testing methodology at [facility] than at other   [facility] hospitals. Direct result comparisons should only    be made within the same method.   SERIAL TROPONIN, 1 HOUR - Normal    Troponin I, High Sensitivity 12      Narrative:     Less than 99th percentile of normal range cutoff-  Female and children under 18 years old <14 ng/L; Male <21 ng/L: Negative  Repeat testing should be performed if clinically indicated.     Female and children under 18 years old 14-50 ng/L; Male 21-50 ng/L:  Consistent with possible cardiac damage and possible increased clinical   risk. Serial measurements may help to assess extent of myocardial damage.     >50 ng/L: Consistent with cardiac damage, increased clinical risk and  myocardial infarction. Serial measurements may help assess extent of   myocardial damage.      NOTE: Children less than 1 year old may have higher baseline troponin   levels and results should be interpreted in conjunction with the overall   clinical context.     NOTE: Troponin I testing is performed using a different   testing methodology at [facility] than at other   [facility]. Direct result comparisons should only   be made within the same method.   TROPONIN SERIES- (INITIAL, 1 HR)    Narrative:     The following orders were created for panel order Troponin I Series, High Sensitivity (0, 1 HR).  Procedure                               Abnormality         Status                     ---------                               -----------         ------                     Troponin I, High Sensiti...[[id number]]  Normal              Final result               Troponin, High Sensitivi...[[id number]]  Normal              Final result                 Please view results for these tests on the individual orders.       All other labs were within normal range or not returned as of this dictation.    Imaging  XR chest 1 view   Final Result   1.  Mild bibasilar atelectasis without evidence of consolidation or   pleural effusion.                  MACRO:   None        Signed by: Heena Mackay 6/15/2024 7:36 PM    Dictation workstation:   [id number]           Procedures  Procedures     EMERGENCY DEPARTMENT COURSE/MDM:     Diagnoses as of 06/15/24 2356   Asymptomatic hypertension        Medical Decision Making  History obtained for the patient.  Records including labs, imaging, notes reviewed.  Patient asymptomatic, however given her profound hypertension and underlying cardiac risk factors, chronic labs were sent.  CMP, CBC unremarkable.  Troponin series within normal limits.  Chest x-ray without evidence of acute cardiopulmonary findings.  Patient remained hypertensive with systolics over the 200s during this time and continued to be asymptomatic.  She was given a dose of intravenous hydralazine with stabilization of blood pressure in the 170s systolic.  Patient instructed to continue her home medication regimen.  Was later found out that she recently had the death of her son which may be contributing to her elevated blood pressure.  She was instructed to follow-up with her PCP this week.  Patient subsequently discharged home in satisfactory condition.  All questions answered and return precautions discussed.    EKG demonstrated sinus rhythm with first-degree AV block at a rate of 62.  Normal intervals.  No acute injury pattern.    Patient and or family in agreement and understanding of treatment plan.  All questions answered.      I reviewed the case with the attending ED physician. The attending ED physician agrees with the plan. Patient and/or patient´s representative was counseled regarding labs, imaging, likely diagnosis, and plan. All questions were answered.    ED Medications administered this visit:    Medications   hydrALAZINE (Apresoline) injection 10 mg (10 mg intravenous Given 6/15/24 2046)       New Prescriptions from this visit:    Discharge Medication List as of 6/15/2024  9:20 PM          Follow-up:  Boroklyn Henriquez DO  [facility] [place] [id number]  [phone]    Schedule an appointment as  soon as possible for a visit           Final Impression:   1. Asymptomatic hypertension          (Please note that portions of this note were completed with a voice recognition program.  Efforts were made to edit the dictations but occasionally words are mis-transcribed.)     Chau Trejo MD  Resident  06/15/24 0452

## 2024-06-16 NOTE — DISCHARGE INSTRUCTIONS
You were evaluated for high blood pressure.  We were able to control this in the emergency department.  Your test results were unremarkable.  Continue taking her blood pressure medications as prescribed.  Please follow-up with your primary care provider at your earliest convenience next week to review discuss potentially changing her medications.  If you develop worsening chest pain, shortness of breath, or other worrisome symptoms, return to the ER.

## 2024-06-17 LAB
ATRIAL RATE: 62 BPM
ATRIAL RATE: 69 BPM
P AXIS: 62 DEGREES
P AXIS: 96 DEGREES
P OFFSET: 142 MS
P OFFSET: 158 MS
P ONSET: 110 MS
P ONSET: 112 MS
PR INTERVAL: 216 MS
PR INTERVAL: 222 MS
Q ONSET: 218 MS
Q ONSET: 223 MS
QRS COUNT: 10 BEATS
QRS COUNT: 11 BEATS
QRS DURATION: 74 MS
QRS DURATION: 82 MS
QT INTERVAL: 408 MS
QT INTERVAL: 414 MS
QTC CALCULATION(BAZETT): 414 MS
QTC CALCULATION(BAZETT): 443 MS
QTC FREDERICIA: 412 MS
QTC FREDERICIA: 434 MS
R AXIS: -66 DEGREES
R AXIS: -72 DEGREES
T AXIS: 74 DEGREES
T AXIS: 81 DEGREES
T OFFSET: 425 MS
T OFFSET: 427 MS
VENTRICULAR RATE: 62 BPM
VENTRICULAR RATE: 69 BPM

## 2024-07-23 ENCOUNTER — TELEPHONE (OUTPATIENT)
Dept: PRIMARY CARE | Facility: CLINIC | Age: 89
End: 2024-07-23
Payer: MEDICARE

## 2024-07-23 DIAGNOSIS — D50.9 IRON DEFICIENCY ANEMIA, UNSPECIFIED IRON DEFICIENCY ANEMIA TYPE: Primary | ICD-10-CM

## 2024-07-23 NOTE — TELEPHONE ENCOUNTER
Reports fatigue, decreased energy. Requesting CBC, iron.   Advised appt but she requested I ask you first.   She is due for a wellness end of next month.

## 2024-07-27 DIAGNOSIS — M54.9 OTHER CHRONIC BACK PAIN: ICD-10-CM

## 2024-07-27 DIAGNOSIS — G89.29 OTHER CHRONIC BACK PAIN: ICD-10-CM

## 2024-07-29 ENCOUNTER — LAB (OUTPATIENT)
Dept: LAB | Facility: LAB | Age: 89
End: 2024-07-29
Payer: MEDICARE

## 2024-07-29 DIAGNOSIS — D50.9 IRON DEFICIENCY ANEMIA, UNSPECIFIED IRON DEFICIENCY ANEMIA TYPE: ICD-10-CM

## 2024-07-29 LAB
ERYTHROCYTE [DISTWIDTH] IN BLOOD BY AUTOMATED COUNT: 15.6 % (ref 11.5–14.5)
FERRITIN SERPL-MCNC: 121 NG/ML (ref 8–150)
HCT VFR BLD AUTO: 34.1 % (ref 36–46)
HGB BLD-MCNC: 10.7 G/DL (ref 12–16)
IRON SATN MFR SERPL: 16 % (ref 25–45)
IRON SERPL-MCNC: 49 UG/DL (ref 35–150)
MCH RBC QN AUTO: 29.8 PG (ref 26–34)
MCHC RBC AUTO-ENTMCNC: 31.4 G/DL (ref 32–36)
MCV RBC AUTO: 95 FL (ref 80–100)
NRBC BLD-RTO: 0 /100 WBCS (ref 0–0)
PLATELET # BLD AUTO: 288 X10*3/UL (ref 150–450)
RBC # BLD AUTO: 3.59 X10*6/UL (ref 4–5.2)
TIBC SERPL-MCNC: 315 UG/DL (ref 240–445)
UIBC SERPL-MCNC: 266 UG/DL (ref 110–370)
WBC # BLD AUTO: 7.1 X10*3/UL (ref 4.4–11.3)

## 2024-07-29 PROCEDURE — 83550 IRON BINDING TEST: CPT

## 2024-07-29 PROCEDURE — 83540 ASSAY OF IRON: CPT

## 2024-07-29 PROCEDURE — 82728 ASSAY OF FERRITIN: CPT

## 2024-07-29 PROCEDURE — 36415 COLL VENOUS BLD VENIPUNCTURE: CPT

## 2024-07-29 PROCEDURE — 85027 COMPLETE CBC AUTOMATED: CPT

## 2024-07-29 RX ORDER — GABAPENTIN 100 MG/1
CAPSULE ORAL
Qty: 180 CAPSULE | Refills: 3 | Status: SHIPPED | OUTPATIENT
Start: 2024-07-29

## 2024-07-29 NOTE — RESULT ENCOUNTER NOTE
Her labs show improvement of her iron levels but her hgb is back down to 10.5     Would she be willing to see hematology?

## 2024-08-22 DIAGNOSIS — M54.9 OTHER CHRONIC BACK PAIN: ICD-10-CM

## 2024-08-22 DIAGNOSIS — G89.29 OTHER CHRONIC BACK PAIN: ICD-10-CM

## 2024-08-22 RX ORDER — GABAPENTIN 100 MG/1
CAPSULE ORAL
Qty: 180 CAPSULE | Refills: 3 | Status: SHIPPED | OUTPATIENT
Start: 2024-08-22

## 2024-08-22 NOTE — TELEPHONE ENCOUNTER
Rx Refill Request Telephone Encounter    Name:  Liana Man  :  [id number]  Medication Name:  gabapentin (Neurontin) 100 mg capsule             Specific Pharmacy location:    [facility] - [address] Phone: [phone]   Fax: [phone]        Date of last appointment:  2024  Date of next appointment:  na

## 2024-09-26 ENCOUNTER — APPOINTMENT (OUTPATIENT)
Dept: PRIMARY CARE | Facility: CLINIC | Age: 89
End: 2024-09-26
Payer: MEDICARE

## 2024-09-26 VITALS
BODY MASS INDEX: 28.66 KG/M2 | RESPIRATION RATE: 16 BRPM | SYSTOLIC BLOOD PRESSURE: 118 MMHG | DIASTOLIC BLOOD PRESSURE: 72 MMHG | TEMPERATURE: 97.5 F | HEIGHT: 60 IN | HEART RATE: 80 BPM | WEIGHT: 146 LBS | OXYGEN SATURATION: 95 %

## 2024-09-26 DIAGNOSIS — I73.9 PERIPHERAL VASCULAR DISEASE, UNSPECIFIED (CMS-HCC): ICD-10-CM

## 2024-09-26 DIAGNOSIS — Z23 ENCOUNTER FOR IMMUNIZATION: ICD-10-CM

## 2024-09-26 DIAGNOSIS — Z00.00 ROUTINE GENERAL MEDICAL EXAMINATION AT HEALTH CARE FACILITY: Primary | ICD-10-CM

## 2024-09-26 DIAGNOSIS — M46.1 SACROILIITIS, NOT ELSEWHERE CLASSIFIED (CMS-HCC): ICD-10-CM

## 2024-09-26 DIAGNOSIS — R26.2 DIFFICULTY WALKING: ICD-10-CM

## 2024-09-26 DIAGNOSIS — D50.8 OTHER IRON DEFICIENCY ANEMIA: ICD-10-CM

## 2024-09-26 DIAGNOSIS — E53.8 VITAMIN B12 DEFICIENCY: ICD-10-CM

## 2024-09-26 DIAGNOSIS — D68.59 OTHER PRIMARY THROMBOPHILIA: ICD-10-CM

## 2024-09-26 DIAGNOSIS — I48.0 PAROXYSMAL ATRIAL FIBRILLATION WITH RVR (MULTI): ICD-10-CM

## 2024-09-26 PROCEDURE — 1157F ADVNC CARE PLAN IN RCRD: CPT | Performed by: INTERNAL MEDICINE

## 2024-09-26 PROCEDURE — 96372 THER/PROPH/DIAG INJ SC/IM: CPT | Performed by: INTERNAL MEDICINE

## 2024-09-26 PROCEDURE — G0439 PPPS, SUBSEQ VISIT: HCPCS | Performed by: INTERNAL MEDICINE

## 2024-09-26 PROCEDURE — 3074F SYST BP LT 130 MM HG: CPT | Performed by: INTERNAL MEDICINE

## 2024-09-26 PROCEDURE — 1159F MED LIST DOCD IN RCRD: CPT | Performed by: INTERNAL MEDICINE

## 2024-09-26 PROCEDURE — G0008 ADMIN INFLUENZA VIRUS VAC: HCPCS | Performed by: INTERNAL MEDICINE

## 2024-09-26 PROCEDURE — 1036F TOBACCO NON-USER: CPT | Performed by: INTERNAL MEDICINE

## 2024-09-26 PROCEDURE — 1170F FXNL STATUS ASSESSED: CPT | Performed by: INTERNAL MEDICINE

## 2024-09-26 PROCEDURE — 1160F RVW MEDS BY RX/DR IN RCRD: CPT | Performed by: INTERNAL MEDICINE

## 2024-09-26 PROCEDURE — 1158F ADVNC CARE PLAN TLK DOCD: CPT | Performed by: INTERNAL MEDICINE

## 2024-09-26 PROCEDURE — 1123F ACP DISCUSS/DSCN MKR DOCD: CPT | Performed by: INTERNAL MEDICINE

## 2024-09-26 PROCEDURE — 90662 IIV NO PRSV INCREASED AG IM: CPT | Performed by: INTERNAL MEDICINE

## 2024-09-26 PROCEDURE — 99214 OFFICE O/P EST MOD 30 MIN: CPT | Performed by: INTERNAL MEDICINE

## 2024-09-26 PROCEDURE — 3078F DIAST BP <80 MM HG: CPT | Performed by: INTERNAL MEDICINE

## 2024-09-26 RX ORDER — CYANOCOBALAMIN 1000 UG/ML
1000 INJECTION, SOLUTION INTRAMUSCULAR; SUBCUTANEOUS
Qty: 10 ML | Refills: 0 | Status: SHIPPED | OUTPATIENT
Start: 2024-09-26

## 2024-09-26 RX ORDER — CYANOCOBALAMIN 1000 UG/ML
1000 INJECTION, SOLUTION INTRAMUSCULAR; SUBCUTANEOUS ONCE
Status: COMPLETED | OUTPATIENT
Start: 2024-09-26 | End: 2024-09-26

## 2024-09-26 ASSESSMENT — ACTIVITIES OF DAILY LIVING (ADL)
BATHING: INDEPENDENT
DOING_HOUSEWORK: NEEDS ASSISTANCE
DRESSING: INDEPENDENT
GROCERY_SHOPPING: NEEDS ASSISTANCE
MANAGING_FINANCES: INDEPENDENT
TAKING_MEDICATION: INDEPENDENT

## 2024-09-26 ASSESSMENT — ENCOUNTER SYMPTOMS
DEPRESSION: 0
LOSS OF SENSATION IN FEET: 1
OCCASIONAL FEELINGS OF UNSTEADINESS: 1

## 2024-09-26 NOTE — PROGRESS NOTES
"Subjective   Reason for Visit: Liana Man is an 89 y.o. female here for a Medicare Wellness visit.     Past Medical, Surgical, and Family History reviewed and updated in chart.    Reviewed all medications by prescribing practitioner or clinical pharmacist (such as prescriptions, OTCs, herbal therapies and supplements) and documented in the medical record.    HPI    Patient Care Team:  Brooklyn Henriquez DO as PCP - General  Brooklyn Henriquez DO as PCP [facility]     Review of Systems    Objective   Vitals:  /72   Pulse 80   Temp 36.4 °C (97.5 °F)   Resp 16   Ht 1.524 m (5')   Wt 66.2 kg (146 lb)   SpO2 95%   BMI 28.51 kg/m²       Physical Exam    Assessment & Plan  Vitamin B12 deficiency    Orders:    cyanocobalamin (Vitamin B-12) 1,000 mcg/mL injection; Inject 1 mL (1,000 mcg) into the muscle every 30 (thirty) days.    syringe with needle 3 mL 25 gauge x 1\" syringe; 1 each every 30 (thirty) days.    cyanocobalamin (Vitamin B-12) injection 1,000 mcg    Encounter for immunization    Orders:    Flu vaccine, trivalent, preservative free, HIGH-DOSE, age 65y+ (Fluzone)    Difficulty walking         Routine general medical examination at health care facility    Orders:    1 Year Follow Up In [facility] - PCP - Wellness Exam; Future       {AWV Counseling (Optional):45942}     "

## 2024-09-26 NOTE — PROGRESS NOTES
Subjective   Reason for Visit: Liana Man is an 89 y.o. female here for a Medicare Wellness visit.     Past Medical, Surgical, and Family History reviewed and updated in chart.    Reviewed all medications by prescribing practitioner or clinical pharmacist (such as prescriptions, OTCs, herbal therapies and supplements) and documented in the medical record.    HPI  Influenza 2023  Covid 2021  PCV13 2015  PPSV23 2021  Shingrix 2020 x2  Tdap 2015  Mammogram 2023  Dexa 2022  Colonoscopy 2022  Adv Dir Yes  She declines further cancer screenings  Requesting referral PT for weakness. She would like to get stronger walking.   Her son and  passed away.  Per pain is terrible  She  is on blood thinner.  She spoke with her cardio about the watchman and she has decided against.      Patient Care Team:  Brooklyn Henriquez DO as PCP - General  Brooklyn Henriquez DO as PCP - [facility] Attributed Provider     Review of Systems   All other systems reviewed and are negative.      Objective   Vitals:  /72   Pulse 80   Temp 36.4 °C (97.5 °F)   Resp 16   Ht 1.524 m (5')   Wt 66.2 kg (146 lb)   SpO2 95%   BMI 28.51 kg/m²       Physical Exam  Vitals reviewed.   Constitutional:       General: She is not in acute distress.     Appearance: Normal appearance.   Cardiovascular:      Rate and Rhythm: Normal rate and regular rhythm.      Pulses: Normal pulses.      Heart sounds: Normal heart sounds.   Pulmonary:      Effort: Pulmonary effort is normal.      Breath sounds: Normal breath sounds.   Chest:      Chest wall: No mass or tenderness.   Breasts:     Right: Normal.      Left: Normal.   Abdominal:      General: Abdomen is flat.      Tenderness: There is no abdominal tenderness.   Musculoskeletal:         General: No swelling.      Cervical back: Neck supple. No tenderness.   Lymphadenopathy:      Cervical: No cervical adenopathy.      Upper Body:      Right upper body: No supraclavicular or axillary  "adenopathy.      Left upper body: No supraclavicular or axillary adenopathy.   Skin:     General: Skin is warm and dry.   Neurological:      Mental Status: She is alert.   Psychiatric:         Attention and Perception: Attention and perception normal.         Mood and Affect: Mood and affect normal.         Assessment & Plan  Vitamin B12 deficiency    Orders:    cyanocobalamin (Vitamin B-12) 1,000 mcg/mL injection; Inject 1 mL (1,000 mcg) into the muscle every 30 (thirty) days.    syringe with needle 3 mL 25 gauge x 1\" syringe; 1 each every 30 (thirty) days.    cyanocobalamin (Vitamin B-12) injection 1,000 mcg    Encounter for immunization    Orders:    Flu vaccine, trivalent, preservative free, HIGH-DOSE, age 65y+ (Fluzone)    Difficulty walking    Orders:    Referral to Physical Therapy; Future  she  is DNR   Routine general medical examination at health care facility    Orders:    1 Year Follow Up In [facility] - PCP - Wellness Exam; Future    CBC; Future    Comprehensive Metabolic Panel; Future    Lipid Panel; Future    Other iron deficiency anemia    Orders:    CBC; Future    Iron and TIBC; Future    Sacroiliitis, not elsewhere classified (CMS-HCC)         Peripheral vascular disease, unspecified (CMS-HCC)         Other primary thrombophilia         Paroxysmal atrial fibrillation with RVR (Multi)            Check labs  Her daughter will drop off the DNR paperwork  Refer to physical therapy.  Call  with any problems or questions.   Follow up in  6 months       "

## 2024-09-26 NOTE — ASSESSMENT & PLAN NOTE
"  Orders:    cyanocobalamin (Vitamin B-12) 1,000 mcg/mL injection; Inject 1 mL (1,000 mcg) into the muscle every 30 (thirty) days.    syringe with needle 3 mL 25 gauge x 1\" syringe; 1 each every 30 (thirty) days.    cyanocobalamin (Vitamin B-12) injection 1,000 mcg    "

## 2024-10-04 ENCOUNTER — LAB (OUTPATIENT)
Dept: LAB | Facility: LAB | Age: 89
End: 2024-10-04
Payer: MEDICARE

## 2024-10-04 DIAGNOSIS — Z00.00 ROUTINE GENERAL MEDICAL EXAMINATION AT HEALTH CARE FACILITY: ICD-10-CM

## 2024-10-04 DIAGNOSIS — D50.8 OTHER IRON DEFICIENCY ANEMIA: ICD-10-CM

## 2024-10-04 LAB
ALBUMIN SERPL BCP-MCNC: 4.2 G/DL (ref 3.4–5)
ALP SERPL-CCNC: 68 U/L (ref 33–136)
ALT SERPL W P-5'-P-CCNC: 6 U/L (ref 7–45)
ANION GAP SERPL CALC-SCNC: 11 MMOL/L (ref 10–20)
AST SERPL W P-5'-P-CCNC: 7 U/L (ref 9–39)
BILIRUB SERPL-MCNC: 0.4 MG/DL (ref 0–1.2)
BUN SERPL-MCNC: 20 MG/DL (ref 6–23)
CALCIUM SERPL-MCNC: 9.7 MG/DL (ref 8.6–10.3)
CHLORIDE SERPL-SCNC: 101 MMOL/L (ref 98–107)
CHOLEST SERPL-MCNC: 153 MG/DL (ref 0–199)
CHOLESTEROL/HDL RATIO: 3.4
CO2 SERPL-SCNC: 33 MMOL/L (ref 21–32)
CREAT SERPL-MCNC: 1.01 MG/DL (ref 0.5–1.05)
EGFRCR SERPLBLD CKD-EPI 2021: 54 ML/MIN/1.73M*2
ERYTHROCYTE [DISTWIDTH] IN BLOOD BY AUTOMATED COUNT: 13.9 % (ref 11.5–14.5)
GLUCOSE SERPL-MCNC: 102 MG/DL (ref 74–99)
HCT VFR BLD AUTO: 38.7 % (ref 36–46)
HDLC SERPL-MCNC: 45.6 MG/DL
HGB BLD-MCNC: 11.7 G/DL (ref 12–16)
IRON SATN MFR SERPL: 12 % (ref 25–45)
IRON SERPL-MCNC: 43 UG/DL (ref 35–150)
LDLC SERPL CALC-MCNC: 76 MG/DL
MCH RBC QN AUTO: 27.6 PG (ref 26–34)
MCHC RBC AUTO-ENTMCNC: 30.2 G/DL (ref 32–36)
MCV RBC AUTO: 91 FL (ref 80–100)
NON HDL CHOLESTEROL: 107 MG/DL (ref 0–149)
NRBC BLD-RTO: 0 /100 WBCS (ref 0–0)
PLATELET # BLD AUTO: 268 X10*3/UL (ref 150–450)
POTASSIUM SERPL-SCNC: 4 MMOL/L (ref 3.5–5.3)
PROT SERPL-MCNC: 6.1 G/DL (ref 6.4–8.2)
RBC # BLD AUTO: 4.24 X10*6/UL (ref 4–5.2)
SODIUM SERPL-SCNC: 141 MMOL/L (ref 136–145)
TIBC SERPL-MCNC: 373 UG/DL (ref 240–445)
TRIGL SERPL-MCNC: 157 MG/DL (ref 0–149)
UIBC SERPL-MCNC: 330 UG/DL (ref 110–370)
VLDL: 31 MG/DL (ref 0–40)
WBC # BLD AUTO: 7 X10*3/UL (ref 4.4–11.3)

## 2024-10-04 PROCEDURE — 36415 COLL VENOUS BLD VENIPUNCTURE: CPT

## 2024-10-08 ENCOUNTER — TELEPHONE (OUTPATIENT)
Dept: PRIMARY CARE | Facility: CLINIC | Age: 89
End: 2024-10-08
Payer: MEDICARE

## 2024-10-08 NOTE — TELEPHONE ENCOUNTER
----- Message from Brooklyn Henriquez sent at 10/7/2024  6:14 PM EDT -----  Labs are ok. Her iron stores are low but hgb is ok.

## 2025-02-08 DIAGNOSIS — M54.89 OTHER CHRONIC BACK PAIN: ICD-10-CM

## 2025-02-08 DIAGNOSIS — G89.29 OTHER CHRONIC BACK PAIN: ICD-10-CM

## 2025-02-10 RX ORDER — GABAPENTIN 100 MG/1
CAPSULE ORAL
Qty: 270 CAPSULE | Refills: 3 | Status: SHIPPED | OUTPATIENT
Start: 2025-02-10

## 2025-03-04 ENCOUNTER — TELEPHONE (OUTPATIENT)
Dept: PRIMARY CARE | Facility: CLINIC | Age: 89
End: 2025-03-04
Payer: MEDICARE